# Patient Record
Sex: FEMALE | Race: WHITE | Employment: FULL TIME
[De-identification: names, ages, dates, MRNs, and addresses within clinical notes are randomized per-mention and may not be internally consistent; named-entity substitution may affect disease eponyms.]

---

## 2017-02-06 ENCOUNTER — OFFICE VISIT (OUTPATIENT)
Dept: FAMILY MEDICINE CLINIC | Facility: CLINIC | Age: 37
End: 2017-02-06

## 2017-02-06 VITALS
BODY MASS INDEX: 28.07 KG/M2 | SYSTOLIC BLOOD PRESSURE: 110 MMHG | RESPIRATION RATE: 18 BRPM | HEART RATE: 68 BPM | WEIGHT: 143 LBS | DIASTOLIC BLOOD PRESSURE: 74 MMHG | HEIGHT: 60 IN

## 2017-02-06 DIAGNOSIS — F17.200 SMOKER: Primary | ICD-10-CM

## 2017-02-06 DIAGNOSIS — I10 ESSENTIAL HYPERTENSION: ICD-10-CM

## 2017-02-06 PROCEDURE — 99213 OFFICE O/P EST LOW 20 MIN: CPT | Performed by: NURSE PRACTITIONER

## 2017-02-06 RX ORDER — DILTIAZEM HYDROCHLORIDE 180 MG/1
180 CAPSULE, COATED, EXTENDED RELEASE ORAL DAILY
Qty: 90 CAPSULE | Refills: 1 | Status: SHIPPED | OUTPATIENT
Start: 2017-02-06 | End: 2017-07-26 | Stop reason: SDUPTHER

## 2017-02-06 RX ORDER — HYDROCHLOROTHIAZIDE 25 MG/1
25 TABLET ORAL DAILY
Qty: 90 TABLET | Refills: 1 | Status: SHIPPED | OUTPATIENT
Start: 2017-02-06 | End: 2017-07-26 | Stop reason: SDUPTHER

## 2017-02-06 ASSESSMENT — ENCOUNTER SYMPTOMS
RHINORRHEA: 0
EYES NEGATIVE: 1
COUGH: 0
CHOKING: 0
ABDOMINAL DISTENTION: 0

## 2017-03-09 ENCOUNTER — OFFICE VISIT (OUTPATIENT)
Dept: FAMILY MEDICINE CLINIC | Facility: CLINIC | Age: 37
End: 2017-03-09

## 2017-03-09 VITALS
WEIGHT: 143 LBS | HEART RATE: 100 BPM | TEMPERATURE: 97.6 F | OXYGEN SATURATION: 98 % | SYSTOLIC BLOOD PRESSURE: 122 MMHG | BODY MASS INDEX: 27.93 KG/M2 | DIASTOLIC BLOOD PRESSURE: 80 MMHG

## 2017-03-09 DIAGNOSIS — L24.5 IRRITANT CONTACT DERMATITIS DUE TO OTHER CHEMICAL PRODUCTS: Primary | ICD-10-CM

## 2017-03-09 PROCEDURE — 99213 OFFICE O/P EST LOW 20 MIN: CPT | Performed by: NURSE PRACTITIONER

## 2017-03-09 RX ORDER — TRIAMCINOLONE ACETONIDE 40 MG/ML
40 INJECTION, SUSPENSION INTRA-ARTICULAR; INTRAMUSCULAR ONCE
Status: COMPLETED | OUTPATIENT
Start: 2017-03-09 | End: 2017-03-09

## 2017-03-09 RX ADMIN — TRIAMCINOLONE ACETONIDE 40 MG: 40 INJECTION, SUSPENSION INTRA-ARTICULAR; INTRAMUSCULAR at 08:58

## 2017-03-09 ASSESSMENT — ENCOUNTER SYMPTOMS
COUGH: 0
SORE THROAT: 0
RHINORRHEA: 0
SHORTNESS OF BREATH: 0

## 2017-05-04 LAB
BUN BLDV-MCNC: 19 MG/DL
CALCIUM SERPL-MCNC: 9.8 MG/DL
CHLORIDE BLD-SCNC: 95 MMOL/L
CHOLESTEROL, TOTAL: 226 MG/DL
CHOLESTEROL/HDL RATIO: 4.1
CO2: 27 MMOL/L
CREAT SERPL-MCNC: 0.57 MG/DL
GFR CALCULATED: >60
GLUCOSE BLD-MCNC: 96 MG/DL
HDLC SERPL-MCNC: 55 MG/DL (ref 35–70)
LDL CHOLESTEROL CALCULATED: 146 MG/DL (ref 0–160)
POTASSIUM SERPL-SCNC: 3.8 MMOL/L
SODIUM BLD-SCNC: 137 MMOL/L
TRIGL SERPL-MCNC: 125 MG/DL
VLDLC SERPL CALC-MCNC: NORMAL MG/DL

## 2017-07-26 ENCOUNTER — TELEPHONE (OUTPATIENT)
Dept: FAMILY MEDICINE CLINIC | Age: 37
End: 2017-07-26

## 2017-07-26 DIAGNOSIS — I10 ESSENTIAL HYPERTENSION: ICD-10-CM

## 2017-07-26 RX ORDER — DILTIAZEM HYDROCHLORIDE 180 MG/1
180 CAPSULE, COATED, EXTENDED RELEASE ORAL DAILY
Qty: 90 CAPSULE | Refills: 1 | Status: SHIPPED | OUTPATIENT
Start: 2017-07-26 | End: 2017-08-07 | Stop reason: SDUPTHER

## 2017-07-26 RX ORDER — HYDROCHLOROTHIAZIDE 25 MG/1
25 TABLET ORAL DAILY
Qty: 90 TABLET | Refills: 1 | Status: SHIPPED | OUTPATIENT
Start: 2017-07-26 | End: 2018-02-05 | Stop reason: SDUPTHER

## 2017-08-07 ENCOUNTER — OFFICE VISIT (OUTPATIENT)
Dept: FAMILY MEDICINE CLINIC | Age: 37
End: 2017-08-07
Payer: COMMERCIAL

## 2017-08-07 VITALS
DIASTOLIC BLOOD PRESSURE: 70 MMHG | RESPIRATION RATE: 18 BRPM | OXYGEN SATURATION: 98 % | HEIGHT: 60 IN | SYSTOLIC BLOOD PRESSURE: 120 MMHG | WEIGHT: 144 LBS | HEART RATE: 78 BPM | BODY MASS INDEX: 28.27 KG/M2

## 2017-08-07 DIAGNOSIS — Z23 NEED FOR PROPHYLACTIC VACCINATION AGAINST STREPTOCOCCUS PNEUMONIAE (PNEUMOCOCCUS): ICD-10-CM

## 2017-08-07 DIAGNOSIS — Z00.00 ANNUAL PHYSICAL EXAM: Primary | ICD-10-CM

## 2017-08-07 DIAGNOSIS — I10 ESSENTIAL HYPERTENSION: ICD-10-CM

## 2017-08-07 DIAGNOSIS — F17.200 SMOKER: ICD-10-CM

## 2017-08-07 PROCEDURE — 99395 PREV VISIT EST AGE 18-39: CPT | Performed by: NURSE PRACTITIONER

## 2017-08-07 PROCEDURE — 90732 PPSV23 VACC 2 YRS+ SUBQ/IM: CPT | Performed by: NURSE PRACTITIONER

## 2017-08-07 PROCEDURE — 90471 IMMUNIZATION ADMIN: CPT | Performed by: NURSE PRACTITIONER

## 2017-08-07 RX ORDER — DILTIAZEM HYDROCHLORIDE 180 MG/1
180 CAPSULE, COATED, EXTENDED RELEASE ORAL DAILY
Qty: 90 CAPSULE | Refills: 3 | Status: SHIPPED | OUTPATIENT
Start: 2017-08-07 | End: 2018-05-05 | Stop reason: SDUPTHER

## 2017-08-07 ASSESSMENT — PATIENT HEALTH QUESTIONNAIRE - PHQ9
2. FEELING DOWN, DEPRESSED OR HOPELESS: 0
1. LITTLE INTEREST OR PLEASURE IN DOING THINGS: 0
SUM OF ALL RESPONSES TO PHQ9 QUESTIONS 1 & 2: 0
SUM OF ALL RESPONSES TO PHQ QUESTIONS 1-9: 0

## 2017-08-07 ASSESSMENT — ENCOUNTER SYMPTOMS
SINUS PRESSURE: 0
COUGH: 0
ABDOMINAL DISTENTION: 0

## 2018-01-04 ENCOUNTER — OFFICE VISIT (OUTPATIENT)
Dept: FAMILY MEDICINE CLINIC | Age: 38
End: 2018-01-04
Payer: COMMERCIAL

## 2018-01-04 VITALS
TEMPERATURE: 97.8 F | WEIGHT: 140 LBS | HEART RATE: 102 BPM | BODY MASS INDEX: 27.34 KG/M2 | OXYGEN SATURATION: 97 % | SYSTOLIC BLOOD PRESSURE: 120 MMHG | DIASTOLIC BLOOD PRESSURE: 80 MMHG

## 2018-01-04 DIAGNOSIS — J01.40 ACUTE NON-RECURRENT PANSINUSITIS: Primary | ICD-10-CM

## 2018-01-04 PROCEDURE — 99213 OFFICE O/P EST LOW 20 MIN: CPT | Performed by: FAMILY MEDICINE

## 2018-01-04 PROCEDURE — G8417 CALC BMI ABV UP PARAM F/U: HCPCS | Performed by: FAMILY MEDICINE

## 2018-01-04 PROCEDURE — G8427 DOCREV CUR MEDS BY ELIG CLIN: HCPCS | Performed by: FAMILY MEDICINE

## 2018-01-04 PROCEDURE — 4004F PT TOBACCO SCREEN RCVD TLK: CPT | Performed by: FAMILY MEDICINE

## 2018-01-04 PROCEDURE — G8484 FLU IMMUNIZE NO ADMIN: HCPCS | Performed by: FAMILY MEDICINE

## 2018-01-04 RX ORDER — AMOXICILLIN AND CLAVULANATE POTASSIUM 875; 125 MG/1; MG/1
1 TABLET, FILM COATED ORAL 2 TIMES DAILY
Qty: 14 TABLET | Refills: 0 | Status: SHIPPED | OUTPATIENT
Start: 2018-01-04 | End: 2018-01-11

## 2018-01-04 RX ORDER — FLUTICASONE PROPIONATE 50 MCG
1 SPRAY, SUSPENSION (ML) NASAL DAILY
Qty: 1 BOTTLE | Refills: 1 | Status: SHIPPED | OUTPATIENT
Start: 2018-01-04 | End: 2018-08-13 | Stop reason: ALTCHOICE

## 2018-01-04 NOTE — PROGRESS NOTES
normal.   Neck: Normal range of motion. Neck supple. No thyromegaly present. Cardiovascular: Normal rate, regular rhythm and normal heart sounds. Exam reveals no gallop and no friction rub. No murmur heard. Pulmonary/Chest: Effort normal and breath sounds normal. No respiratory distress. She has no wheezes. She has no rales. She exhibits no tenderness. Abdominal: Soft. Bowel sounds are normal. She exhibits no distension. There is no tenderness. There is no rebound and no guarding. Musculoskeletal: Normal range of motion. She exhibits no edema. Lymphadenopathy:     She has no cervical adenopathy. Neurological: She is alert and oriented to person, place, and time. She exhibits normal muscle tone. Coordination normal.   Skin: Skin is warm and dry. No rash noted. No erythema. Nursing note and vitals reviewed. Vitals:  /80   Pulse 102   Temp 97.8 °F (36.6 °C)   Wt 140 lb (63.5 kg)   SpO2 97%   BMI 27.34 kg/m²       Data:     Lab Results   Component Value Date     05/04/2017    K 3.8 05/04/2017    CL 95 05/04/2017    CO2 27 05/04/2017    BUN 19 05/04/2017    CREATININE 0.57 05/04/2017    GLUCOSE 96 05/04/2017     No results found for: WBC, RBC, HGB, HCT, MCV, MCH, MCHC, RDW, PLT, MPV  Lab Results   Component Value Date    TSH 2.63 01/19/2016     Lab Results   Component Value Date    CHOL 226 05/04/2017    HDL 55 05/04/2017          Assessment:       1. Acute non-recurrent pansinusitis         Plan:     1. Acute sinusitis-Rx Augmentin, Rx Flonase for symptomatic relief of congestion. Recommend Tylenol/ibuprofen as a for pain/fever. Recommend increased fluid intake. Follow-up if not improving.   Patient voiced understanding          Completed Refills   Requested Prescriptions     Signed Prescriptions Disp Refills    amoxicillin-clavulanate (AUGMENTIN) 875-125 MG per tablet 14 tablet 0     Sig: Take 1 tablet by mouth 2 times daily for 7 days    fluticasone (FLONASE) 50 MCG/ACT nasal spray 1 Bottle 1     Si spray by Nasal route daily     Return if symptoms worsen or fail to improve. Orders Placed This Encounter   Medications    amoxicillin-clavulanate (AUGMENTIN) 875-125 MG per tablet     Sig: Take 1 tablet by mouth 2 times daily for 7 days     Dispense:  14 tablet     Refill:  0    fluticasone (FLONASE) 50 MCG/ACT nasal spray     Si spray by Nasal route daily     Dispense:  1 Bottle     Refill:  1     No orders of the defined types were placed in this encounter. There are no Patient Instructions on file for this visit. Electronically signed by Geni Waterman DO on 2018 at 1:19 PM         Completed Refills   Requested Prescriptions     Signed Prescriptions Disp Refills    amoxicillin-clavulanate (AUGMENTIN) 875-125 MG per tablet 14 tablet 0     Sig: Take 1 tablet by mouth 2 times daily for 7 days    fluticasone (FLONASE) 50 MCG/ACT nasal spray 1 Bottle 1     Si spray by Nasal route daily         Marislo Lewis received counseling on the following healthy behaviors: nutrition and exercise  Reviewed prior labs and health maintenance. Continue current medications, diet and exercise. Discussed use, benefit, and side effects of prescribed medications. Barriers to medication compliance addressed. Patient given educational materials - see patient instructions. All patient questions answered. Patient voiced understanding.

## 2018-01-14 ASSESSMENT — ENCOUNTER SYMPTOMS
TROUBLE SWALLOWING: 0
VOMITING: 0
RHINORRHEA: 1
NAUSEA: 0
CONSTIPATION: 0
SHORTNESS OF BREATH: 0
SINUS PRESSURE: 1
ABDOMINAL DISTENTION: 0
COUGH: 1
PHOTOPHOBIA: 0
DIARRHEA: 0
COLOR CHANGE: 0
BACK PAIN: 0
SINUS PAIN: 1
ABDOMINAL PAIN: 0
WHEEZING: 0

## 2018-02-05 ENCOUNTER — TELEPHONE (OUTPATIENT)
Dept: FAMILY MEDICINE CLINIC | Age: 38
End: 2018-02-05

## 2018-02-05 RX ORDER — HYDROCHLOROTHIAZIDE 25 MG/1
25 TABLET ORAL DAILY
Qty: 90 TABLET | Refills: 1 | Status: SHIPPED | OUTPATIENT
Start: 2018-02-05 | End: 2018-06-30 | Stop reason: SDUPTHER

## 2018-05-05 DIAGNOSIS — I10 ESSENTIAL HYPERTENSION: ICD-10-CM

## 2018-05-07 RX ORDER — DILTIAZEM HYDROCHLORIDE 180 MG/1
180 CAPSULE, COATED, EXTENDED RELEASE ORAL DAILY
Qty: 90 CAPSULE | Refills: 1 | Status: SHIPPED | OUTPATIENT
Start: 2018-05-07 | End: 2018-08-13 | Stop reason: SDUPTHER

## 2018-08-13 ENCOUNTER — OFFICE VISIT (OUTPATIENT)
Dept: FAMILY MEDICINE CLINIC | Age: 38
End: 2018-08-13
Payer: COMMERCIAL

## 2018-08-13 VITALS
SYSTOLIC BLOOD PRESSURE: 130 MMHG | WEIGHT: 142 LBS | BODY MASS INDEX: 26.81 KG/M2 | HEIGHT: 61 IN | DIASTOLIC BLOOD PRESSURE: 74 MMHG | HEART RATE: 79 BPM | OXYGEN SATURATION: 99 %

## 2018-08-13 DIAGNOSIS — I10 ESSENTIAL HYPERTENSION: ICD-10-CM

## 2018-08-13 DIAGNOSIS — Z00.00 ANNUAL PHYSICAL EXAM: Primary | ICD-10-CM

## 2018-08-13 DIAGNOSIS — H57.02 UNEQUAL PUPILS: ICD-10-CM

## 2018-08-13 DIAGNOSIS — F17.200 SMOKER: ICD-10-CM

## 2018-08-13 PROCEDURE — 99395 PREV VISIT EST AGE 18-39: CPT | Performed by: NURSE PRACTITIONER

## 2018-08-13 RX ORDER — DILTIAZEM HYDROCHLORIDE 180 MG/1
180 CAPSULE, COATED, EXTENDED RELEASE ORAL DAILY
Qty: 90 CAPSULE | Refills: 3 | Status: SHIPPED | OUTPATIENT
Start: 2018-08-13 | End: 2019-06-30 | Stop reason: SDUPTHER

## 2018-08-13 ASSESSMENT — PATIENT HEALTH QUESTIONNAIRE - PHQ9
SUM OF ALL RESPONSES TO PHQ QUESTIONS 1-9: 0
1. LITTLE INTEREST OR PLEASURE IN DOING THINGS: 0
SUM OF ALL RESPONSES TO PHQ QUESTIONS 1-9: 0
SUM OF ALL RESPONSES TO PHQ9 QUESTIONS 1 & 2: 0
2. FEELING DOWN, DEPRESSED OR HOPELESS: 0

## 2018-08-13 ASSESSMENT — ENCOUNTER SYMPTOMS
ABDOMINAL DISTENTION: 0
CHEST TIGHTNESS: 0
SHORTNESS OF BREATH: 0
EYES NEGATIVE: 1

## 2018-08-13 NOTE — PROGRESS NOTES
Subjective:      Patient ID: Marek Richards is a 41 y.o. female. Social History     Social History    Marital status:      Spouse name: López Landrum Number of children: 1    Years of education: 15     Social History Main Topics    Smoking status: Current Every Day Smoker     Packs/day: 0.50     Types: Cigarettes    Smokeless tobacco: Never Used    Alcohol use Yes      Comment: rarely    Drug use: No    Sexual activity: Not Asked     Other Topics Concern    None     Social History Narrative    None     Family History   Problem Relation Age of Onset    Hypertension Mother     Depression Mother     Other Father         essential tremor    Other Brother         familial tremor    Heart Disease Brother         valve disorder    Hypertension Maternal Grandmother     Emphysema Maternal Grandfather         smoker    Other Paternal Grandmother         sleep apnea    Heart Disease Paternal Grandfather         11 stents,     Other Paternal Grandfather         back problems nerve stimulator     No past medical history on file. HPI  Annual physical   Right handed   RN at [employer].   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       htn- doing okay no headache, no chest pain. Weight stable    Smoking 1/2 ppd- smoking cessation  Tried for one day. Tried chantix was nausea. Pap- Thierno Alexander will schedule appt. Never abnormal pap. Left 8 mm and right pupil 6 mm. Review of Systems   Constitutional: Negative for activity change, chills and fever. HENT: Negative for congestion. Eyes: Negative. Respiratory: Negative for chest tightness and shortness of breath. Cardiovascular: Negative for chest pain, palpitations and leg swelling. Gastrointestinal: Negative for abdominal distention. Musculoskeletal: Negative for arthralgias. Neurological: Negative for dizziness. Psychiatric/Behavioral: Negative for agitation.        Objective:   Physical Exam   Constitutional: She is oriented to person, place, and time. She appears well-developed and well-nourished. No distress. HENT:   Head: Normocephalic and atraumatic. Right Ear: External ear normal.   Left Ear: External ear normal.   Mouth/Throat: Oropharynx is clear and moist.   Eyes: Pupils are equal, round, and reactive to light. EOM are normal. No scleral icterus. Neck: Normal range of motion. Neck supple. No thyromegaly present. Cardiovascular: Normal rate, regular rhythm and normal heart sounds. No murmur heard. No carotid bruit maeve   Pulmonary/Chest: Effort normal and breath sounds normal. No respiratory distress. She has no wheezes. Abdominal: Soft. There is no tenderness. Musculoskeletal: Normal range of motion. She exhibits no edema. Lymphadenopathy:     She has no cervical adenopathy. Neurological: She is alert and oriented to person, place, and time. Skin: Skin is warm and dry. No rash noted. Psychiatric: She has a normal mood and affect. Her behavior is normal. Judgment and thought content normal.   Nursing note and vitals reviewed. Assessment / Plan:   1. Annual physical exam      2. Essential hypertension  Stable and controlled  - diltiazem (CARDIZEM CD) 180 MG extended release capsule; Take 1 capsule by mouth daily  Dispense: 90 capsule; Refill: 3    3. Smoker  Encouraged to quit, discussed patch gum, has tried several options with meds in past with no success. Is decreasing. 4. Unequal pupils  Chronic.         see annually with labs every 6 months  1. [facility] received counseling on the following healthy behaviors: nutrition, exercise and medication adherence    anticipatory guidance, seat belt/car seat safety and medication adherence  2. Patient given educational materials - see patient instructions  3. Discussed use, benefit, and side effects of prescribed medications. Barriers to medication compliance addressed. All patient questions answered. Pt voiced understanding.    4.  Reviewed prior labs and health

## 2019-01-03 ENCOUNTER — TELEPHONE (OUTPATIENT)
Dept: FAMILY MEDICINE CLINIC | Age: 39
End: 2019-01-03

## 2019-01-03 DIAGNOSIS — Z13.29 SCREENING FOR THYROID DISORDER: Primary | ICD-10-CM

## 2019-01-03 DIAGNOSIS — I10 ESSENTIAL HYPERTENSION: ICD-10-CM

## 2019-01-08 ENCOUNTER — HOSPITAL ENCOUNTER (OUTPATIENT)
Age: 39
Discharge: HOME | End: 2019-01-08
Payer: COMMERCIAL

## 2019-01-08 DIAGNOSIS — Z13.29 SCREENING FOR THYROID DISORDER: ICD-10-CM

## 2019-01-08 DIAGNOSIS — I10 ESSENTIAL HYPERTENSION: ICD-10-CM

## 2019-01-08 LAB
ANION GAP SERPL CALCULATED.3IONS-SCNC: 12 MMOL/L (ref 9–17)
BUN BLDV-MCNC: 12 MG/DL (ref 6–20)
BUN/CREAT BLD: 20 (ref 9–20)
CALCIUM SERPL-MCNC: 10.1 MG/DL (ref 8.6–10.4)
CHLORIDE BLD-SCNC: 95 MMOL/L (ref 98–107)
CO2: 30 MMOL/L (ref 20–31)
CREAT SERPL-MCNC: 0.6 MG/DL (ref 0.5–0.9)
GFR AFRICAN AMERICAN: >60 ML/MIN
GFR NON-AFRICAN AMERICAN: >60 ML/MIN
GFR SERPL CREATININE-BSD FRML MDRD: ABNORMAL ML/MIN/{1.73_M2}
GFR SERPL CREATININE-BSD FRML MDRD: ABNORMAL ML/MIN/{1.73_M2}
GLUCOSE BLD-MCNC: 98 MG/DL (ref 70–99)
POTASSIUM SERPL-SCNC: 3.7 MMOL/L (ref 3.7–5.3)
SODIUM BLD-SCNC: 137 MMOL/L (ref 135–144)
TSH SERPL DL<=0.05 MIU/L-ACNC: 1.75 MIU/L (ref 0.3–5)

## 2019-01-08 PROCEDURE — 84443 ASSAY THYROID STIM HORMONE: CPT

## 2019-01-08 PROCEDURE — 80048 BASIC METABOLIC PNL TOTAL CA: CPT

## 2019-01-08 PROCEDURE — 36415 COLL VENOUS BLD VENIPUNCTURE: CPT

## 2019-01-08 RX ORDER — NICOTINE 21 MG/24HR
1 PATCH, TRANSDERMAL 24 HOURS TRANSDERMAL EVERY 24 HOURS
Qty: 42 PATCH | Refills: 0 | Status: SHIPPED | OUTPATIENT
Start: 2019-01-08 | End: 2019-08-19 | Stop reason: ALTCHOICE

## 2019-02-19 ENCOUNTER — TELEPHONE (OUTPATIENT)
Dept: FAMILY MEDICINE CLINIC | Age: 39
End: 2019-02-19

## 2019-02-19 RX ORDER — OSELTAMIVIR PHOSPHATE 75 MG/1
75 CAPSULE ORAL DAILY
Qty: 10 CAPSULE | Refills: 0 | Status: SHIPPED | OUTPATIENT
Start: 2019-02-19 | End: 2019-03-01

## 2019-06-30 DIAGNOSIS — I10 ESSENTIAL HYPERTENSION: ICD-10-CM

## 2019-07-01 RX ORDER — DILTIAZEM HYDROCHLORIDE 180 MG/1
180 CAPSULE, COATED, EXTENDED RELEASE ORAL DAILY
Qty: 90 CAPSULE | Refills: 3 | Status: SHIPPED | OUTPATIENT
Start: 2019-07-01 | End: 2020-04-27 | Stop reason: SDUPTHER

## 2019-07-01 RX ORDER — HYDROCHLOROTHIAZIDE 25 MG/1
25 TABLET ORAL DAILY
Qty: 90 TABLET | Refills: 3 | Status: SHIPPED | OUTPATIENT
Start: 2019-07-01 | End: 2020-04-27 | Stop reason: SDUPTHER

## 2019-08-19 ENCOUNTER — OFFICE VISIT (OUTPATIENT)
Dept: FAMILY MEDICINE CLINIC | Age: 39
End: 2019-08-19
Payer: COMMERCIAL

## 2019-08-19 VITALS
HEIGHT: 62 IN | BODY MASS INDEX: 27.23 KG/M2 | HEART RATE: 86 BPM | OXYGEN SATURATION: 98 % | WEIGHT: 148 LBS | TEMPERATURE: 98.2 F | DIASTOLIC BLOOD PRESSURE: 76 MMHG | SYSTOLIC BLOOD PRESSURE: 120 MMHG

## 2019-08-19 DIAGNOSIS — F17.200 SMOKER: ICD-10-CM

## 2019-08-19 DIAGNOSIS — Z00.00 ANNUAL PHYSICAL EXAM: Primary | ICD-10-CM

## 2019-08-19 DIAGNOSIS — F41.9 ANXIETY: ICD-10-CM

## 2019-08-19 DIAGNOSIS — I10 ESSENTIAL HYPERTENSION: ICD-10-CM

## 2019-08-19 PROCEDURE — 99395 PREV VISIT EST AGE 18-39: CPT | Performed by: NURSE PRACTITIONER

## 2019-08-19 RX ORDER — BUPROPION HYDROCHLORIDE 150 MG/1
150 TABLET, EXTENDED RELEASE ORAL 2 TIMES DAILY
COMMUNITY
Start: 2019-08-15 | End: 2021-09-20 | Stop reason: ALTCHOICE

## 2019-08-19 ASSESSMENT — ENCOUNTER SYMPTOMS
COUGH: 0
ABDOMINAL DISTENTION: 0

## 2019-08-19 ASSESSMENT — PATIENT HEALTH QUESTIONNAIRE - PHQ9
1. LITTLE INTEREST OR PLEASURE IN DOING THINGS: 0
2. FEELING DOWN, DEPRESSED OR HOPELESS: 0
SUM OF ALL RESPONSES TO PHQ QUESTIONS 1-9: 0
SUM OF ALL RESPONSES TO PHQ9 QUESTIONS 1 & 2: 0
SUM OF ALL RESPONSES TO PHQ QUESTIONS 1-9: 0

## 2020-04-27 ENCOUNTER — PATIENT MESSAGE (OUTPATIENT)
Dept: FAMILY MEDICINE CLINIC | Age: 40
End: 2020-04-27

## 2020-04-27 RX ORDER — HYDROCHLOROTHIAZIDE 25 MG/1
25 TABLET ORAL DAILY
Qty: 90 TABLET | Refills: 1 | Status: SHIPPED | OUTPATIENT
Start: 2020-04-27 | End: 2020-10-28

## 2020-04-27 RX ORDER — DILTIAZEM HYDROCHLORIDE 180 MG/1
180 CAPSULE, COATED, EXTENDED RELEASE ORAL DAILY
Qty: 90 CAPSULE | Refills: 1 | Status: SHIPPED | OUTPATIENT
Start: 2020-04-27 | End: 2020-10-28

## 2020-08-24 ENCOUNTER — OFFICE VISIT (OUTPATIENT)
Dept: FAMILY MEDICINE CLINIC | Age: 40
End: 2020-08-24
Payer: COMMERCIAL

## 2020-08-24 VITALS
SYSTOLIC BLOOD PRESSURE: 114 MMHG | BODY MASS INDEX: 28.89 KG/M2 | OXYGEN SATURATION: 98 % | DIASTOLIC BLOOD PRESSURE: 78 MMHG | TEMPERATURE: 98.6 F | HEIGHT: 61 IN | WEIGHT: 153 LBS | HEART RATE: 88 BPM

## 2020-08-24 PROCEDURE — 99395 PREV VISIT EST AGE 18-39: CPT | Performed by: NURSE PRACTITIONER

## 2020-08-24 SDOH — ECONOMIC STABILITY: FOOD INSECURITY: WITHIN THE PAST 12 MONTHS, YOU WORRIED THAT YOUR FOOD WOULD RUN OUT BEFORE YOU GOT MONEY TO BUY MORE.: NEVER TRUE

## 2020-08-24 SDOH — ECONOMIC STABILITY: INCOME INSECURITY: HOW HARD IS IT FOR YOU TO PAY FOR THE VERY BASICS LIKE FOOD, HOUSING, MEDICAL CARE, AND HEATING?: NOT HARD AT ALL

## 2020-08-24 SDOH — ECONOMIC STABILITY: TRANSPORTATION INSECURITY
IN THE PAST 12 MONTHS, HAS THE LACK OF TRANSPORTATION KEPT YOU FROM MEDICAL APPOINTMENTS OR FROM GETTING MEDICATIONS?: NO

## 2020-08-24 SDOH — ECONOMIC STABILITY: FOOD INSECURITY: WITHIN THE PAST 12 MONTHS, THE FOOD YOU BOUGHT JUST DIDN'T LAST AND YOU DIDN'T HAVE MONEY TO GET MORE.: NEVER TRUE

## 2020-08-24 SDOH — ECONOMIC STABILITY: TRANSPORTATION INSECURITY
IN THE PAST 12 MONTHS, HAS LACK OF TRANSPORTATION KEPT YOU FROM MEETINGS, WORK, OR FROM GETTING THINGS NEEDED FOR DAILY LIVING?: NO

## 2020-08-24 ASSESSMENT — ENCOUNTER SYMPTOMS
RHINORRHEA: 0
COUGH: 0
EYES NEGATIVE: 1
SINUS PAIN: 0
ABDOMINAL DISTENTION: 0
SHORTNESS OF BREATH: 0

## 2020-08-24 NOTE — PROGRESS NOTES
2020    Priti Baez (:  1980) is a 47 y.o. female, here for a preventive medicine evaluation. 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 completely asymptomatic and did well. Returned to work 2020. Pt's Child did not get tested. No other family members were ill. New onset mass left upper arm irregular shaped estimated 8 cm length x 5 cm width palpable , possible lipoma suspected. Non tender , new finding in last 2 months. No lymphadenopathy. No fever no night sweats. Weight stable    Wt Readings from Last 3 Encounters:   20 153 lb (69.4 kg)   19 148 lb (67.1 kg)   18 142 lb (64.4 kg)     Quit smoking. On wellbutrin through GYN (hx zoloft use prior)     htn-no chest pain, no dizziness, no shortness of breath. Maternal hx alpha antitrypsin positive pt would like tested too. Stopped smoking feels better. On wellbutrin. Controlling anxiety      Review of Systems   Constitutional: Negative for activity change, appetite change, chills and fever. HENT: Negative for congestion, rhinorrhea and sinus pain. Eyes: Negative. Respiratory: Negative for cough and shortness of breath. Cardiovascular: Negative for chest pain and leg swelling. Gastrointestinal: Negative for abdominal distention. Endocrine: Negative for cold intolerance and heat intolerance. Genitourinary: Negative for difficulty urinating. Musculoskeletal: Negative for arthralgias. Left upper arm lump incidental finding when swimming this summer. Skin: Negative for rash. Neurological: Negative for dizziness and headaches. Prior to Visit Medications    Medication Sig Taking?  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         Past Surgical History:   Procedure Laterality Date     SECTION      Dr. Mackenzie Cobos Right 2013    SALPINGECTOMY Bilateral 2016       Social History     Socioeconomic History    Marital status:      Spouse name: Lucas Wells Number of children: 1    Years of education: 15    Highest education level: Not on file   Occupational History    Not on file   Social Needs    Financial resource strain: Not hard at all   Nextly insecurity     Worry: Never true     Inability: Never true   [employer] needs     Medical: No     Non-medical: No   Tobacco Use    Smoking status: Former Smoker     Packs/day: 0.50     Types: Cigarettes     Last attempt to quit: 2019     Years since quittin.6    Smokeless tobacco: Never Used   Substance and Sexual Activity    Alcohol use: Yes     Comment: rarely    Drug use: No    Sexual activity: Not on file   Lifestyle    Physical activity     Days per week: Not on file     Minutes per session: Not on file    Stress: Not on file   Relationships    Social connections     Talks on phone: Not on file     Gets together: Not on file     Attends Buddhist service: Not on file     Active member of club or organization: Not on file     Attends meetings of clubs or organizations: Not on file     Relationship status: Not on file    Intimate partner violence     Fear of current or ex partner: Not on file     Emotionally abused: Not on file Physically abused: Not on file     Forced sexual activity: Not on file   Other Topics Concern    Not on file   Social History Narrative    Not on file        Family History   Problem Relation Age of Onset    Hypertension Mother     Depression Mother    Frida Arch Other Mother 61        alpha 1 antitrypsin deficiency     Other Father         essential tremor    Other Brother         familial tremor    Heart Disease Brother         valve disorder    Hypertension Maternal Grandmother     Emphysema Maternal Grandfather         smoker    Other Paternal Grandmother         sleep apnea    Heart Disease Paternal Grandfather         11 stents,     Other Paternal Grandfather         back problems nerve stimulator       ADVANCE DIRECTIVE: N, <no information>    Vitals:    08/24/20 1458   BP: 114/78   Site: Left Upper Arm   Position: Sitting   Pulse: 88   Temp: 98.6 °F (37 °C)   TempSrc: Oral   SpO2: 98%   Weight: 153 lb (69.4 kg)   Height: 5' 1\" (1.549 m)     Estimated body mass index is 28.91 kg/m² as calculated from the following:    Height as of this encounter: 5' 1\" (1.549 m). Weight as of this encounter: 153 lb (69.4 kg). Physical Exam  Vitals signs and nursing note reviewed. Constitutional:       General: She is not in acute distress. Appearance: Normal appearance. She is well-developed. HENT:      Head: Normocephalic and atraumatic. Right Ear: Tympanic membrane and external ear normal.      Left Ear: Tympanic membrane and external ear normal.      Nose: No congestion. Mouth/Throat:      Mouth: Mucous membranes are dry. Eyes:      General: No scleral icterus. Pupils: Pupils are equal, round, and reactive to light. Neck:      Musculoskeletal: Normal range of motion and neck supple. Cardiovascular:      Rate and Rhythm: Normal rate and regular rhythm. Heart sounds: Normal heart sounds. No murmur. Pulmonary:      Effort: Pulmonary effort is normal. No respiratory distress. Breath sounds: Normal breath sounds. No wheezing. Abdominal:      Palpations: Abdomen is soft. Tenderness: There is no abdominal tenderness. Musculoskeletal: Normal range of motion. General: No tenderness. Lymphadenopathy:      Cervical: No cervical adenopathy. Skin:     General: Skin is warm and dry. Neurological:      Mental Status: She is alert and oriented to person, place, and time. Psychiatric:         Behavior: Behavior normal.         Thought Content: Thought content normal.         Judgment: Judgment normal.         Right upper arm mass soft like lipoma new found by pt in last 2 months 7 cm length x 5 cm width. No flowsheet data found. Lab Results   Component Value Date    CHOL 226 05/04/2017    CHOL 214 01/19/2016    CHOL 214 08/14/2013    TRIG 125 05/04/2017    TRIG 199 01/19/2016    TRIG 96 08/14/2013    HDL 55 05/04/2017    HDL 50 01/19/2016    HDL 55 08/14/2013    LDLCHOLESTEROL 124 01/19/2016    LDLCHOLESTEROL 140 08/14/2013    LDLCALC 146 05/04/2017    GLUCOSE 98 01/08/2019       The ASCVD Risk score (Herlinda Earl, et al., 2013) failed to calculate for the following reasons: The 2013 ASCVD risk score is only valid for ages 36 to 78    Immunization History   Administered Date(s) Administered    Pneumococcal Polysaccharide ([id number]) 08/07/2017    Tdap (Boostrix, Adacel) 05/19/2013       Health Maintenance   Topic Date Due    HIV screen  02/20/1997    Varicella vaccine (2 of 2 - 13+ 2-dose series) 09/12/2014    Potassium monitoring  01/08/2020    Creatinine monitoring  01/08/2020    Flu vaccine (1) 09/01/2020    Cervical cancer screen  02/21/2022    DTaP/Tdap/Td vaccine (3 - Td) 08/15/2024    Hepatitis A vaccine  Aged Out    Hepatitis B vaccine  Aged Out    Hib vaccine  Aged Out    Meningococcal (ACWY) vaccine  Aged Out    Pneumococcal 0-64 years Vaccine  Aged Out       ASSESSMENT/PLAN:  1. Annual physical exam    - Lipid Panel;  Future  -

## 2020-08-24 NOTE — PATIENT INSTRUCTIONS
You may be receiving a survey from [employer] regarding your visit today. You may get this in the mail, through your MyChart or in your email. Please complete the survey to enable us to provide the highest quality of care to you and your family. If you cannot score us as very good ( 5 Stars) on any question, please feel free to call the office to discuss how we could have made your experience exceptional.     Thank You! BRUNA Nguyễn-CNP  [address]  [place], [place]

## 2020-08-25 ENCOUNTER — HOSPITAL ENCOUNTER (OUTPATIENT)
Age: 40
Discharge: HOME | End: 2020-08-25
Payer: COMMERCIAL

## 2020-08-25 LAB
ALBUMIN SERPL-MCNC: 4.8 G/DL (ref 3.5–5.2)
ALBUMIN/GLOBULIN RATIO: ABNORMAL (ref 1–2.5)
ALP BLD-CCNC: 73 U/L (ref 35–104)
ALPHA-1 ANTITRYPSIN: 87 MG/DL (ref 90–200)
ALT SERPL-CCNC: 12 U/L (ref 5–33)
ANION GAP SERPL CALCULATED.3IONS-SCNC: 11 MMOL/L (ref 9–17)
AST SERPL-CCNC: 18 U/L
BILIRUB SERPL-MCNC: 0.45 MG/DL (ref 0.3–1.2)
BUN BLDV-MCNC: 8 MG/DL (ref 6–20)
BUN/CREAT BLD: 11 (ref 9–20)
CALCIUM SERPL-MCNC: 10.2 MG/DL (ref 8.6–10.4)
CHLORIDE BLD-SCNC: 96 MMOL/L (ref 98–107)
CHOLESTEROL/HDL RATIO: 3.8
CHOLESTEROL: 215 MG/DL
CO2: 29 MMOL/L (ref 20–31)
CREAT SERPL-MCNC: 0.7 MG/DL (ref 0.5–0.9)
GFR AFRICAN AMERICAN: >60 ML/MIN
GFR NON-AFRICAN AMERICAN: >60 ML/MIN
GFR SERPL CREATININE-BSD FRML MDRD: ABNORMAL ML/MIN/{1.73_M2}
GFR SERPL CREATININE-BSD FRML MDRD: ABNORMAL ML/MIN/{1.73_M2}
GLUCOSE BLD-MCNC: 107 MG/DL (ref 70–99)
HDLC SERPL-MCNC: 56 MG/DL
LDL CHOLESTEROL: 137 MG/DL (ref 0–130)
PATIENT FASTING?: YES
POTASSIUM SERPL-SCNC: 3.4 MMOL/L (ref 3.7–5.3)
SODIUM BLD-SCNC: 136 MMOL/L (ref 135–144)
TOTAL PROTEIN: 8.6 G/DL (ref 6.4–8.3)
TRIGL SERPL-MCNC: 110 MG/DL
VLDLC SERPL CALC-MCNC: ABNORMAL MG/DL (ref 1–30)

## 2020-08-25 PROCEDURE — 80061 LIPID PANEL: CPT

## 2020-08-25 PROCEDURE — 82103 ALPHA-1-ANTITRYPSIN TOTAL: CPT

## 2020-08-25 PROCEDURE — 36415 COLL VENOUS BLD VENIPUNCTURE: CPT

## 2020-08-25 PROCEDURE — 80053 COMPREHEN METABOLIC PANEL: CPT

## 2020-10-28 RX ORDER — HYDROCHLOROTHIAZIDE 25 MG/1
TABLET ORAL
Qty: 90 TABLET | Refills: 3 | Status: SHIPPED | OUTPATIENT
Start: 2020-10-28 | End: 2021-09-20 | Stop reason: SDUPTHER

## 2020-10-28 RX ORDER — DILTIAZEM HYDROCHLORIDE 180 MG/1
CAPSULE, COATED, EXTENDED RELEASE ORAL
Qty: 90 CAPSULE | Refills: 3 | Status: SHIPPED | OUTPATIENT
Start: 2020-10-28 | End: 2021-09-20 | Stop reason: SDUPTHER

## 2020-11-30 ENCOUNTER — TELEPHONE (OUTPATIENT)
Dept: FAMILY MEDICINE CLINIC | Age: 40
End: 2020-11-30

## 2020-11-30 NOTE — TELEPHONE ENCOUNTER
Patient would like a letter stating that she is exempt for the flu shot - please let her know - states that she never has gotten the flu shot    Health Maintenance   Topic Date Due    HIV screen  02/20/1997    Varicella vaccine (2 of 2 - 13+ 2-dose series) 09/12/2014    Flu vaccine (1) 09/01/2020    Potassium monitoring  08/25/2021    Creatinine monitoring  08/25/2021    Cervical cancer screen  02/21/2022    DTaP/Tdap/Td vaccine (3 - Td) 08/15/2024    Hepatitis A vaccine  Aged Out    Hepatitis B vaccine  Aged Out    Hib vaccine  Aged Out    Meningococcal (ACWY) vaccine  Aged Out    Pneumococcal 0-64 years Vaccine  Aged Out             (applicable per patient's age: Cancer Screenings, Depression Screening, Fall Risk Screening, Immunizations)    LDL Cholesterol (mg/dL)   Date Value   08/25/2020 137 (H)     LDL Calculated (mg/dL)   Date Value   05/04/2017 146     AST (U/L)   Date Value   08/25/2020 18     ALT (U/L)   Date Value   08/25/2020 12     BUN (mg/dL)   Date Value   08/25/2020 8      (goal A1C is < 7)   (goal LDL is <100) need 30-50% reduction from baseline     BP Readings from Last 3 Encounters:   08/24/20 114/78   08/19/19 120/76   08/13/18 130/74    (goal /80)      All Future Testing planned in [facility]:      Next Visit Date:  Future Appointments   Date Time Provider Ara Chilel   8/23/2021  3:30 PM BRUNA Girard - ZIYAD Josues Signs MED [facility]            Patient Active Problem List:     Hypertension     Rosacea     Smoker     Unequal pupils

## 2020-12-23 ENCOUNTER — HOSPITAL ENCOUNTER (OUTPATIENT)
Dept: ULTRASOUND IMAGING | Age: 40
Discharge: HOME | End: 2020-12-25
Payer: COMMERCIAL

## 2020-12-23 PROCEDURE — 76882 US LMTD JT/FCL EVL NVASC XTR: CPT

## 2021-03-01 ENCOUNTER — OFFICE VISIT (OUTPATIENT)
Dept: FAMILY MEDICINE CLINIC | Age: 41
End: 2021-03-01
Payer: COMMERCIAL

## 2021-03-01 VITALS
SYSTOLIC BLOOD PRESSURE: 110 MMHG | HEART RATE: 94 BPM | OXYGEN SATURATION: 99 % | DIASTOLIC BLOOD PRESSURE: 80 MMHG | BODY MASS INDEX: 29.29 KG/M2 | WEIGHT: 155 LBS

## 2021-03-01 DIAGNOSIS — D17.22 LIPOMA OF LEFT UPPER EXTREMITY: ICD-10-CM

## 2021-03-01 DIAGNOSIS — I10 ESSENTIAL HYPERTENSION: ICD-10-CM

## 2021-03-01 DIAGNOSIS — F41.9 ANXIETY: ICD-10-CM

## 2021-03-01 DIAGNOSIS — Z01.818 PREOP GENERAL PHYSICAL EXAM: Primary | ICD-10-CM

## 2021-03-01 PROCEDURE — 99213 OFFICE O/P EST LOW 20 MIN: CPT | Performed by: NURSE PRACTITIONER

## 2021-03-01 ASSESSMENT — PATIENT HEALTH QUESTIONNAIRE - PHQ9
SUM OF ALL RESPONSES TO PHQ QUESTIONS 1-9: 0
1. LITTLE INTEREST OR PLEASURE IN DOING THINGS: 0
SUM OF ALL RESPONSES TO PHQ QUESTIONS 1-9: 0
SUM OF ALL RESPONSES TO PHQ9 QUESTIONS 1 & 2: 0
SUM OF ALL RESPONSES TO PHQ QUESTIONS 1-9: 0

## 2021-03-01 NOTE — PROGRESS NOTES
[facility] [address] [address]  Dept: [phone]  Dept Fax: [phone]    Last encounter 2020    Pre-op Exam (Pt here for pre op clearance for surgery on 21.)       HPI:   Antoinette No is a 46 y.o. female who presentstoday for her medical conditions/complaints as noted below. Antoinette No is c/o of Pre-op Exam (Pt here for pre op clearance for surgery on 21.)      HPI  Established patient    Patient here today for preop physical related to a left upper arm lipoma being treated by Ksenia Rae  plastic surgeon with a surgery scheduled in 2020    EKG received reviewed and shows NSR    Pt denies any chest pain, dizziness or shortness of breath. 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        Past Surgical History:   Procedure Laterality Date     SECTION      Dr. Crain Estimable Right 2013    SALPINGECTOMY Bilateral 2016       Family History   Problem Relation Age of Onset    Hypertension Mother     Depression Mother     Other Mother 61        alpha 1 antitrypsin deficiency     Other Father         essential tremor maeve upper extremities    Other Brother         familial tremor    Heart Disease Brother         valve disorder    Hypertension Maternal Grandmother     Emphysema Maternal Grandfather         smoker    Other Paternal Grandmother         sleep apnea    Heart Disease Paternal Grandfather         11 stents,     Other Paternal Grandfather         back problems nerve stimulator       Social History     Tobacco Use    Smoking status: Former Smoker     Packs/day: 0.50     Types: Cigarettes     Quit date: 2019     Years since quittin.1    Smokeless tobacco: Never Used Substance Use Topics    Alcohol use: Yes     Comment: rarely      Current Outpatient Medications   Medication Sig Dispense Refill    hydroCHLOROthiazide (HYDRODIURIL) 25 MG tablet TAKE 1 TABLET DAILY 90 tablet 3    dilTIAZem (CARDIZEM CD) 180 MG extended release capsule TAKE 1 CAPSULE DAILY 90 capsule 3    buPROPion (WELLBUTRIN SR) 150 MG extended release tablet Take 150 mg by mouth 2 times daily       No current facility-administered medications for this visit. No Known Allergies    Health Maintenance   Topic Date Due    Hepatitis C screen  Never done    HIV screen  Never done    Varicella vaccine (2 of 2 - 13+ 2-dose series) 09/12/2014    Flu vaccine (1) Never done    Potassium monitoring  08/25/2021    Creatinine monitoring  08/25/2021    Cervical cancer screen  02/21/2022    DTaP/Tdap/Td vaccine (3 - Td) 08/15/2024    Hepatitis A vaccine  Aged Out    Hepatitis B vaccine  Aged Out    Hib vaccine  Aged Out    Meningococcal (ACWY) vaccine  Aged Out    Pneumococcal 0-64 years Vaccine  Aged Out       Subjective:      Review of Systems   Constitutional: Negative for activity change, appetite change, chills and fever. HENT: Negative for congestion, rhinorrhea and sore throat. Eyes: Negative. Respiratory: Negative for cough and shortness of breath. Cardiovascular: Negative for chest pain. Gastrointestinal: Negative for abdominal distention. Endocrine: Negative for cold intolerance and heat intolerance. Genitourinary: Negative for difficulty urinating. Musculoskeletal: Negative for arthralgias and gait problem. Neurological: Negative for dizziness and headaches. Psychiatric/Behavioral: Negative for agitation. The patient is not nervous/anxious. Objective:     Physical Exam  Vitals signs and nursing note reviewed. Constitutional:       General: She is not in acute distress. Appearance: Normal appearance. She is well-developed.    HENT:      Head: Normocephalic and atraumatic. Right Ear: Tympanic membrane and external ear normal.      Left Ear: Tympanic membrane and external ear normal.      Nose: No congestion. Mouth/Throat:      Mouth: Mucous membranes are moist.   Eyes:      General: No scleral icterus. Pupils: Pupils are equal, round, and reactive to light. Neck:      Musculoskeletal: Normal range of motion and neck supple. Cardiovascular:      Rate and Rhythm: Normal rate and regular rhythm. Heart sounds: Normal heart sounds. No murmur. Pulmonary:      Effort: Pulmonary effort is normal. No respiratory distress. Breath sounds: Normal breath sounds. No wheezing. Abdominal:      Palpations: Abdomen is soft. Tenderness: There is no abdominal tenderness. Musculoskeletal: Normal range of motion. General: Deformity present. No tenderness. Swelling: left upper arm posterior lipoma soft mass palpable approx 3 inches in size. Right lower leg: No edema. Left lower leg: No edema. Lymphadenopathy:      Cervical: No cervical adenopathy. Skin:     General: Skin is warm and dry. Neurological:      Mental Status: She is alert and oriented to person, place, and time. Psychiatric:         Behavior: Behavior normal.         Thought Content:  Thought content normal.         Judgment: Judgment normal.       /80 (Site: Right Upper Arm, Position: Sitting, Cuff Size: Medium Adult)   Pulse 94   Wt 155 lb (70.3 kg)   SpO2 99%   BMI 29.29 kg/m²     Data:     Lab Results   Component Value Date     08/25/2020    K 3.4 08/25/2020    CL 96 08/25/2020    CO2 29 08/25/2020    BUN 8 08/25/2020    CREATININE 0.70 08/25/2020    GLUCOSE 107 08/25/2020    PROT 8.6 08/25/2020    LABALBU 4.8 08/25/2020    BILITOT 0.45 08/25/2020    ALKPHOS 73 08/25/2020    AST 18 08/25/2020    ALT 12 08/25/2020     No results found for: WBC, RBC, HGB, HCT, MCV, MCH, MCHC, RDW, PLT, MPV  Lab Results   Component Value Date    TSH 1.75 01/08/2019 Lab Results   Component Value Date    CHOL 215 08/25/2020    CHOL 226 05/04/2017    HDL 56 08/25/2020          Assessment & Plan       1. Preop general physical exam    Cleared for surgery   2. Lipoma of left upper extremity      3. Essential hypertension    Controlled on diltiazem and HCTZ     4. Anxiety  Controlled with wellbutrin 150 mg SR bid pt offers only taking in am  Quit smoking 1/2021. Completed Refills   Requested Prescriptions      No prescriptions requested or ordered in this encounter     No follow-ups on file. No orders of the defined types were placed in this encounter. No orders of the defined types were placed in this encounter. Frida Garcia received counseling on the following healthy behaviors: nutrition, exercise and medication adherence  Reviewed prior labs and health maintenance. Continue current medications, diet and exercise. Discussed use, benefit, and side effects of prescribed medications. Barriers to medication compliance addressed. Patient given educational materials - see patient instructions. All patient questions answered. Patient voiced understanding. On this date 3/1/2021 I have spent 21 minutes reviewing previous notes, test results and face to face with the patient discussing the diagnosis and importance of compliance with the treatment plan as well as documenting on the day of the visit.      Electronically signed by BRUNA Yeh CNP on 3/2/2021 at 4:47 PM

## 2021-03-02 ASSESSMENT — ENCOUNTER SYMPTOMS
EYES NEGATIVE: 1
ABDOMINAL DISTENTION: 0
RHINORRHEA: 0
SHORTNESS OF BREATH: 0
SORE THROAT: 0
COUGH: 0

## 2021-09-20 ENCOUNTER — OFFICE VISIT (OUTPATIENT)
Dept: FAMILY MEDICINE CLINIC | Age: 41
End: 2021-09-20
Payer: COMMERCIAL

## 2021-09-20 VITALS
DIASTOLIC BLOOD PRESSURE: 70 MMHG | BODY MASS INDEX: 27.19 KG/M2 | OXYGEN SATURATION: 99 % | SYSTOLIC BLOOD PRESSURE: 110 MMHG | HEART RATE: 90 BPM | WEIGHT: 144 LBS | HEIGHT: 61 IN

## 2021-09-20 DIAGNOSIS — I10 ESSENTIAL HYPERTENSION: ICD-10-CM

## 2021-09-20 DIAGNOSIS — F41.9 ANXIETY: ICD-10-CM

## 2021-09-20 DIAGNOSIS — Z13.220 SCREENING CHOLESTEROL LEVEL: ICD-10-CM

## 2021-09-20 DIAGNOSIS — Z00.00 ANNUAL PHYSICAL EXAM: Primary | ICD-10-CM

## 2021-09-20 PROCEDURE — 99395 PREV VISIT EST AGE 18-39: CPT | Performed by: NURSE PRACTITIONER

## 2021-09-20 RX ORDER — DESVENLAFAXINE 50 MG/1
TABLET, EXTENDED RELEASE ORAL
COMMUNITY
Start: 2021-09-01 | End: 2022-03-30

## 2021-09-20 RX ORDER — DILTIAZEM HYDROCHLORIDE 180 MG/1
CAPSULE, COATED, EXTENDED RELEASE ORAL
Qty: 90 CAPSULE | Refills: 3 | Status: SHIPPED | OUTPATIENT
Start: 2021-09-20 | End: 2022-09-20 | Stop reason: SDUPTHER

## 2021-09-20 RX ORDER — HYDROCHLOROTHIAZIDE 25 MG/1
TABLET ORAL
Qty: 90 TABLET | Refills: 3 | Status: SHIPPED | OUTPATIENT
Start: 2021-09-20 | End: 2022-09-08

## 2021-09-20 RX ORDER — BUSPIRONE HYDROCHLORIDE 5 MG/1
TABLET ORAL
COMMUNITY
Start: 2021-09-01 | End: 2022-03-30

## 2021-09-20 SDOH — ECONOMIC STABILITY: FOOD INSECURITY: WITHIN THE PAST 12 MONTHS, THE FOOD YOU BOUGHT JUST DIDN'T LAST AND YOU DIDN'T HAVE MONEY TO GET MORE.: NEVER TRUE

## 2021-09-20 SDOH — ECONOMIC STABILITY: FOOD INSECURITY: WITHIN THE PAST 12 MONTHS, YOU WORRIED THAT YOUR FOOD WOULD RUN OUT BEFORE YOU GOT MONEY TO BUY MORE.: NEVER TRUE

## 2021-09-20 ASSESSMENT — SOCIAL DETERMINANTS OF HEALTH (SDOH): HOW HARD IS IT FOR YOU TO PAY FOR THE VERY BASICS LIKE FOOD, HOUSING, MEDICAL CARE, AND HEATING?: NOT HARD AT ALL

## 2021-09-20 NOTE — PROGRESS NOTES
2021    Jackeline Redmond (:  1980) is a 46 y.o. female, here for a preventive medicine evaluation. Patient Active Problem List   Diagnosis    Hypertension    Rosacea    Smoker    Unequal pupils     46year old female  Annual physical     htn-well-controlled with no tacky rhythms continues on Cardizem and hydrochlorothiazide with good control  And has had recent weight loss of 10 pounds she will contact office if she has more than 20 pound weight loss total to see if her blood pressure medications need adjusted at that time    Patient continues to avoid nicotine use    Nickie Lamp: pristiq, buspar recent starts patient is using the BuSpar just as needed Pristiq is working well    Patient works at [employer] did have history of Covid infection, pleated both Covid vaccinations    Patient with history of left shoulder mass removed by plastics healing scar present doing well with full range of motion    Review of Systems   Constitutional: Negative for activity change, chills, diaphoresis and fever. HENT: Negative for congestion and postnasal drip. Eyes: Negative. Respiratory: Negative for cough and shortness of breath. Cardiovascular: Negative for chest pain, palpitations and leg swelling. Gastrointestinal: Negative for abdominal distention. Endocrine: Negative for cold intolerance and heat intolerance. Genitourinary: Negative for difficulty urinating. Musculoskeletal: Negative for arthralgias. Skin: Negative for rash. Neurological: Negative for dizziness and headaches. Psychiatric/Behavioral: Negative for sleep disturbance. The patient is not nervous/anxious. Prior to Visit Medications    Medication Sig Taking?  Authorizing Provider   desvenlafaxine succinate (PRISTIQ) 50 MG TB24 extended release tablet TAKE 1 TABLET BY MOUTH once a day Yes Historical Provider, MD   busPIRone (BUSPAR) 5 MG tablet TAKE 1/2 (ONE-HALF) TO 1 (ONE) TABLET BY MOUTH every night AS NEEDED Yes Historical Provider, MD   hydroCHLOROthiazide (HYDRODIURIL) 25 MG tablet TAKE 1 TABLET DAILY Yes BRUNA Mccarthy CNP   dilTIAZem (CARDIZEM CD) 180 MG extended release capsule TAKE 1 CAPSULE DAILY Yes BRUNA Mccarthy CNP        No Known Allergies    Past Medical History:   Diagnosis Date    Benign essential HTN     Chicken pox     as child    COVID-19 2020    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 Past Surgical History:   Procedure Laterality Date     SECTION      Dr. Mima Capone Right 2013    SALPINGECTOMY Bilateral 2016       Social History     Socioeconomic History    Marital status:      Spouse name: Erin Shetty Number of children: 1    Years of education: 15    Highest education level: Not on file   Occupational History    Not on file   Tobacco Use    Smoking status: Former Smoker     Packs/day: 0.50     Types: Cigarettes     Quit date: 2019     Years since quittin.6    Smokeless tobacco: Never Used   Substance and Sexual Activity    Alcohol use: Yes     Comment: rarely    Drug use: No    Sexual activity: Not on file   Other Topics Concern    Not on file   Social History Narrative    Not on file     Social Determinants of Health     Financial Resource Strain: Low Risk     Difficulty of Paying Living Expenses: Not hard at all   Food Insecurity: No Food Insecurity    Worried About [address] in the Last Year: Never true    [address]  in the Last Year: Never true   Transportation Needs:     Lack of Transportation (Medical):      Lack of Transportation (Non-Medical):    Physical Activity:     Days of Exercise per Week:     Minutes of Exercise per Session:    Stress:     Feeling of Stress :    Social Connections:     Frequency of Communication with Friends and Family:     Frequency of Social Gatherings with Friends and Family:     Attends [employer] Services:  Active Member of Clubs or Organizations:     Attends Club or Organization Meetings:     Marital Status:    Intimate Partner Violence:     Fear of Current or Ex-Partner:     Emotionally Abused:     Physically Abused:     Sexually Abused:         Family History   Problem Relation Age of Onset    Hypertension Mother     Depression Mother     Other Mother 61        alpha 1 antitrypsin deficiency     Other Father         essential tremor maeve upper extremities    Other Brother         familial tremor    Heart Disease Brother         valve disorder    Hypertension Maternal Grandmother     Emphysema Maternal Grandfather         smoker    Other Paternal Grandmother         sleep apnea    Heart Disease Paternal Grandfather         11 stents,     Other Paternal Grandfather         back problems nerve stimulator       ADVANCE DIRECTIVE: N, <no information>    Vitals:    09/20/21 1404   BP: 110/70   Site: Left Upper Arm   Position: Sitting   Cuff Size: Medium Adult   Pulse: 90   SpO2: 99%   Weight: 154 lb (69.9 kg)   Height: 5' 1\" (1.549 m)     Estimated body mass index is 29.1 kg/m² as calculated from the following:    Height as of this encounter: 5' 1\" (1.549 m). Weight as of this encounter: 154 lb (69.9 kg). Physical Exam  Vitals and nursing note reviewed. Constitutional:       General: She is not in acute distress. Appearance: Normal appearance. She is well-developed. HENT:      Head: Normocephalic and atraumatic. Right Ear: Tympanic membrane and external ear normal.      Left Ear: Tympanic membrane and external ear normal.      Mouth/Throat:      Mouth: Mucous membranes are moist.   Eyes:      General: No scleral icterus. Pupils: Pupils are equal, round, and reactive to light. Neck:      Vascular: No carotid bruit (neg maeve). Cardiovascular:      Rate and Rhythm: Normal rate and regular rhythm. Heart sounds: Normal heart sounds. No murmur heard.      Pulmonary: Effort: Pulmonary effort is normal. No respiratory distress. Breath sounds: Normal breath sounds. No wheezing. Abdominal:      Palpations: Abdomen is soft. Tenderness: There is no abdominal tenderness. Musculoskeletal:         General: Normal range of motion. Cervical back: Normal range of motion and neck supple. Right lower leg: No edema. Left lower leg: No edema. Lymphadenopathy:      Cervical: No cervical adenopathy. Skin:     General: Skin is warm and dry. Neurological:      Mental Status: She is alert and oriented to person, place, and time. Psychiatric:         Behavior: Behavior normal.         Thought Content: Thought content normal.         Judgment: Judgment normal.         No flowsheet data found. Lab Results   Component Value Date    CHOL 215 08/25/2020    CHOL 226 05/04/2017    CHOL 214 01/19/2016    CHOL 214 08/14/2013    TRIG 110 08/25/2020    TRIG 125 05/04/2017    TRIG 199 01/19/2016    HDL 56 08/25/2020    HDL 55 05/04/2017    HDL 50 01/19/2016    LDLCHOLESTEROL 137 08/25/2020    LDLCHOLESTEROL 124 01/19/2016    LDLCHOLESTEROL 140 08/14/2013    LDLCALC 146 05/04/2017    GLUCOSE 107 08/25/2020       The ASCVD Risk score (Ying Coreas et al., 2013) failed to calculate for the following reasons:     The 2013 ASCVD risk score is only valid for ages 36 to 78    Immunization History   Administered Date(s) Administered    COVID-19, Moderna, PF, 100mcg/0.5mL 04/19/2021, 05/18/2021    Hepatitis B Adult (Recombivax HB) 08/15/2014    Hepatitis B Ped/Adol (Engerix-B, Recombivax HB) 04/04/2014    Pneumococcal Polysaccharide ([id number]) 08/07/2017    Tdap (Boostrix, Adacel) 05/19/2013, 08/15/2014    Varicella (Varivax) 08/15/2014       Health Maintenance   Topic Date Due    Hepatitis C screen  Never done    HIV screen  Never done    Varicella vaccine (2 of 2 - 13+ 2-dose series) 09/12/2014    Potassium monitoring  08/25/2021    Creatinine monitoring  08/25/2021  Flu vaccine (1) Never done    Cervical cancer screen  09/02/2021    DTaP/Tdap/Td vaccine (3 - Td or Tdap) 08/15/2024    COVID-19 Vaccine  Completed    Hepatitis A vaccine  Aged Out    Hepatitis B vaccine  Aged Out    Hib vaccine  Aged Out    Meningococcal (ACWY) vaccine  Aged Out    Pneumococcal 0-64 years Vaccine  Aged Out          ASSESSMENT/PLAN:  1. Essential hypertension  -     dilTIAZem (CARDIZEM CD) 180 MG extended release capsule; TAKE 1 CAPSULE DAILY, Disp-90 capsule, R-3Normal      No follow-ups on file. An electronic signature was used to authenticate this note.     --BRUNA Chung - CNP on 9/20/2021 at 2:16 PM

## 2021-09-21 ASSESSMENT — ENCOUNTER SYMPTOMS
ABDOMINAL DISTENTION: 0
EYES NEGATIVE: 1
SHORTNESS OF BREATH: 0
COUGH: 0

## 2022-03-29 ENCOUNTER — HOSPITAL ENCOUNTER (OUTPATIENT)
Age: 42
Discharge: HOME | End: 2022-03-29
Payer: COMMERCIAL

## 2022-03-29 DIAGNOSIS — Z13.220 SCREENING CHOLESTEROL LEVEL: ICD-10-CM

## 2022-03-29 DIAGNOSIS — I10 ESSENTIAL HYPERTENSION: ICD-10-CM

## 2022-03-29 DIAGNOSIS — Z00.00 ANNUAL PHYSICAL EXAM: ICD-10-CM

## 2022-03-29 LAB
ABSOLUTE EOS #: 0.1 K/UL (ref 0–0.4)
ABSOLUTE LYMPH #: 1.9 K/UL (ref 1–4.8)
ABSOLUTE MONO #: 0.4 K/UL (ref 0–1)
ALBUMIN SERPL-MCNC: 4.8 G/DL (ref 3.5–5.2)
ALP BLD-CCNC: 53 U/L (ref 35–104)
ALT SERPL-CCNC: 15 U/L (ref 5–33)
ANION GAP SERPL CALCULATED.3IONS-SCNC: 12 MMOL/L (ref 9–17)
AST SERPL-CCNC: 18 U/L
BASOPHILS # BLD: 0 % (ref 0–2)
BASOPHILS ABSOLUTE: 0 K/UL (ref 0–0.2)
BILIRUB SERPL-MCNC: 0.53 MG/DL (ref 0.3–1.2)
BUN BLDV-MCNC: 12 MG/DL (ref 6–20)
BUN/CREAT BLD: 19 (ref 9–20)
CALCIUM SERPL-MCNC: 9.6 MG/DL (ref 8.6–10.4)
CHLORIDE BLD-SCNC: 96 MMOL/L (ref 98–107)
CHOLESTEROL/HDL RATIO: 4
CHOLESTEROL: 246 MG/DL
CO2: 25 MMOL/L (ref 20–31)
CREAT SERPL-MCNC: 0.64 MG/DL (ref 0.5–0.9)
DIFFERENTIAL TYPE: YES
EOSINOPHILS RELATIVE PERCENT: 1 % (ref 0–5)
GFR AFRICAN AMERICAN: >60 ML/MIN
GFR NON-AFRICAN AMERICAN: >60 ML/MIN
GFR SERPL CREATININE-BSD FRML MDRD: ABNORMAL ML/MIN/{1.73_M2}
GLUCOSE BLD-MCNC: 103 MG/DL (ref 70–99)
HCT VFR BLD CALC: 39.8 % (ref 36–46)
HDLC SERPL-MCNC: 62 MG/DL
HEMOGLOBIN: 13.9 G/DL (ref 12–16)
LDL CHOLESTEROL: 158 MG/DL (ref 0–130)
LYMPHOCYTES # BLD: 32 % (ref 15–40)
MCH RBC QN AUTO: 32 PG (ref 26–34)
MCHC RBC AUTO-ENTMCNC: 34.9 G/DL (ref 31–37)
MCV RBC AUTO: 91.5 FL (ref 80–100)
MONOCYTES # BLD: 6 % (ref 4–8)
PATIENT FASTING?: YES
PDW BLD-RTO: 12.9 % (ref 12.1–15.2)
PLATELET # BLD: 383 K/UL (ref 140–450)
POTASSIUM SERPL-SCNC: 3.7 MMOL/L (ref 3.7–5.3)
RBC # BLD: 4.35 M/UL (ref 4–5.2)
SEG NEUTROPHILS: 61 % (ref 47–75)
SEGMENTED NEUTROPHILS ABSOLUTE COUNT: 3.6 K/UL (ref 2.5–7)
SODIUM BLD-SCNC: 133 MMOL/L (ref 135–144)
TOTAL PROTEIN: 7.1 G/DL (ref 6.4–8.3)
TRIGL SERPL-MCNC: 128 MG/DL
WBC # BLD: 5.9 K/UL (ref 3.5–11)

## 2022-03-29 PROCEDURE — 85025 COMPLETE CBC W/AUTO DIFF WBC: CPT

## 2022-03-29 PROCEDURE — 36415 COLL VENOUS BLD VENIPUNCTURE: CPT

## 2022-03-29 PROCEDURE — 84443 ASSAY THYROID STIM HORMONE: CPT

## 2022-03-29 PROCEDURE — 80053 COMPREHEN METABOLIC PANEL: CPT

## 2022-03-29 PROCEDURE — 80061 LIPID PANEL: CPT

## 2022-03-30 ENCOUNTER — OFFICE VISIT (OUTPATIENT)
Dept: PRIMARY CARE CLINIC | Age: 42
End: 2022-03-30
Payer: COMMERCIAL

## 2022-03-30 VITALS
OXYGEN SATURATION: 98 % | HEIGHT: 61 IN | SYSTOLIC BLOOD PRESSURE: 120 MMHG | BODY MASS INDEX: 29.07 KG/M2 | WEIGHT: 154 LBS | DIASTOLIC BLOOD PRESSURE: 78 MMHG | HEART RATE: 100 BPM

## 2022-03-30 DIAGNOSIS — I10 PRIMARY HYPERTENSION: ICD-10-CM

## 2022-03-30 DIAGNOSIS — Z13.29 SCREENING FOR THYROID DISORDER: ICD-10-CM

## 2022-03-30 LAB — TSH SERPL DL<=0.05 MIU/L-ACNC: 2.01 UIU/ML (ref 0.3–5)

## 2022-03-30 PROCEDURE — 99213 OFFICE O/P EST LOW 20 MIN: CPT | Performed by: NURSE PRACTITIONER

## 2022-03-30 ASSESSMENT — PATIENT HEALTH QUESTIONNAIRE - PHQ9
2. FEELING DOWN, DEPRESSED OR HOPELESS: 0
SUM OF ALL RESPONSES TO PHQ QUESTIONS 1-9: 0
1. LITTLE INTEREST OR PLEASURE IN DOING THINGS: 0
SUM OF ALL RESPONSES TO PHQ QUESTIONS 1-9: 0
SUM OF ALL RESPONSES TO PHQ9 QUESTIONS 1 & 2: 0
SUM OF ALL RESPONSES TO PHQ QUESTIONS 1-9: 0
SUM OF ALL RESPONSES TO PHQ QUESTIONS 1-9: 0

## 2022-03-30 NOTE — PATIENT INSTRUCTIONS
You may be receiving a survey from [employer] regarding your visit today. You may get this in the mail, through your MyChart or in your email. Please complete the survey to enable us to provide the highest quality of care to you and your family. If you cannot score us as very good ( 5 Stars) on any question, please feel free to call the office to discuss how we could have made your experience exceptional.     Thank You! BRUNA Velasquez-CNP  [address] [place]  [place]        Phone: [phone]  Fax: [facility] [place] Office Hours:  Monday: [facility] office location [phone] ([phone]) Offering additional late hours the first Monday of the month until 7 pm.   Tuesday: 8-5 Wednesday: 8-5 Thursday:  Additional hours offered 2 Thursdays a month. Please call to inquire those dates. Fridays: 7:30-4:30  Patient Education        Learning About Diuretics for High Blood Pressure  Overview  Diuretics help to lower blood pressure. This reduces your risk of a heartattack and stroke. It also reduces your risk of kidney disease. Diuretics cause your kidneys to remove sodium and water. They also relax theblood vessel walls. These help lower your blood pressure. Examples   Chlorthalidone   Hydrochlorothiazide  Possible side effects  There are some common side effects. They are:   Too little potassium.  Feeling dizzy.  Rash.  Urinating a lot.  High blood sugar. (But this is not common.)  You may have other side effects. Check the information that comes with yourmedicine. What to know about taking this medicine   You may take other medicines for blood pressure. Diuretics can help those work better. They can also prevent extra fluid in your body.  You may need to take potassium pills. Ask your doctor about this.  You may need blood tests to check on your health. For example, you may have tests to check your kidneys and your potassium level.    Take your medicines exactly as prescribed. Call your doctor if you think you are having a problem with your medicine.  Check with your doctor or pharmacist before you use any other medicines. This includes over-the-counter medicines. Make sure your doctor knows all of the medicines, vitamins, herbal products, and supplements you take. Taking some medicines together can cause problems. Where can you learn more? Go to [email or web address] and sign in to your [employer] account. Enter J569 in the [employer] box to learn more about \"Learning About Diuretics for High Blood Pressure. \"     If you do not have an account, please click on the \"Sign Up Now\" link. Current as of: January 10, 2022               Content Version: 13.2  © 2006-2022 Healthwise, Incorporated. Care instructions adapted under license by [facility] ([facility]). If you have questions about a medical condition or this instruction, always ask your healthcare professional. [place] [address] any warranty or liability for your use of this information.

## 2022-03-30 NOTE — PROGRESS NOTES
[facility] [address] [address]  Dept: [phone]     Subjective:  Glenroy Orozco is a 38 y.o. female presenting today for routine follow up of hypertension, hyperlipidemia, hypothyroidism and impaired fasting glucose and recent labs. She denies chest pain, shortness of breath or palpitations. She denies headaches, vision changes and lightheadedness. She denies myalgias. She denies numbness and tingling in the hands and feet. She has been compliant with diet and exercise. She has been compliant with her medications. She is tolerating medications. Pt with illness in January, positive covid-19  full 10 days off work, 2nd time positive for covid 19       · Hypertension: well controlled with current medications   · Medications: continue current medication doses   Eye exam done wears contacts and glasses     The 10-year ASCVD risk score (Felisa Gillis et al., 2013) is: 0.8%    Values used to calculate the score:      Age: 38 years      Sex: Female      Is Non- : No      Diabetic: No      Tobacco smoker: No      Systolic Blood Pressure: 706 mmHg      Is BP treated: Yes      HDL Cholesterol: 62 mg/dL      Total Cholesterol: 246 mg/dL    · Hyperlipidemia: fairly well controlled   · Medications: discussed < 1% risk,    · Recommended low cholesterol diet       · Impaired fasting glucose:  stable  · Recommend low carb diet    · Nutrition Status:  overweight  BMI 29    · Recommend continue current healthy lifestyle with diet and regular exercise         CURRENT ALLERGIES: Patient has no known allergies. SOCIAL HISTORY:   Social History     Tobacco Use    Smoking status: Former Smoker     Packs/day: 0.50     Types: Cigarettes     Quit date: 1/11/2019     Years since quitting: 3.2    Smokeless tobacco: Never Used   Substance Use Topics    Alcohol use: Yes     Comment: rarely    Drug use:  No [facility]       Medication List          Accurate as of March 30, 2022 11:59 PM. If you have any questions, ask your nurse or doctor. CONTINUE taking these medications    dilTIAZem 180 MG extended release capsule  Commonly known as: CARDIZEM CD  TAKE 1 CAPSULE DAILY     hydroCHLOROthiazide 25 MG tablet  Commonly known as: HYDRODIURIL  TAKE 1 TABLET DAILY               Review of Systems   Constitutional: Negative for activity change, chills and fever. HENT: Negative for congestion and sore throat. Eyes: Negative. Respiratory: Negative for cough. Cardiovascular: Negative for chest pain and leg swelling. Gastrointestinal: Negative for abdominal distention and constipation. Endocrine: Negative for cold intolerance and heat intolerance. Genitourinary: Negative for difficulty urinating. Musculoskeletal: Negative for arthralgias. Objective:  /78   Pulse 100   Ht 5' 1\" (1.549 m)   Wt 154 lb (69.9 kg)   SpO2 98%   BMI 29.10 kg/m²   Physical Exam  Vitals and nursing note reviewed. Constitutional:       General: She is not in acute distress. Appearance: Normal appearance. She is well-developed. HENT:      Head: Normocephalic and atraumatic. Right Ear: External ear normal.      Left Ear: Tympanic membrane and external ear normal.      Nose: No congestion. Mouth/Throat:      Mouth: Mucous membranes are moist.      Pharynx: No posterior oropharyngeal erythema. Eyes:      General: No scleral icterus. Pupils: Pupils are equal, round, and reactive to light. Neck:      Vascular: No carotid bruit (neg maeve). Cardiovascular:      Rate and Rhythm: Normal rate and regular rhythm. Heart sounds: Normal heart sounds. No murmur heard. Pulmonary:      Effort: Pulmonary effort is normal. No respiratory distress. Breath sounds: Normal breath sounds. No wheezing. Abdominal:      Palpations: Abdomen is soft. Tenderness:  There is no abdominal tenderness. Musculoskeletal:         General: Normal range of motion. Cervical back: Normal range of motion and neck supple. Right lower leg: No edema. Left lower leg: No edema. Lymphadenopathy:      Cervical: No cervical adenopathy. Skin:     General: Skin is warm and dry. Neurological:      Mental Status: She is alert and oriented to person, place, and time. Psychiatric:         Behavior: Behavior normal.         Thought Content: Thought content normal.         Judgment: Judgment normal.           Diagnostic Data:  Lab Results   Component Value Date    GLUCOSE 103 (H) 03/29/2022     Lab Results   Component Value Date    BUN 12 03/29/2022    CREATININE 0.64 03/29/2022     (L) 03/29/2022    K 3.7 03/29/2022    CALCIUM 9.6 03/29/2022    CL 96 (L) 03/29/2022    CO2 25 03/29/2022    LABGLOM >60 03/29/2022     Lab Results   Component Value Date    CHOL 246 (H) 03/29/2022    HDL 62 03/29/2022    LDLCHOLESTEROL 158 (H) 03/29/2022    LDLCALC 146 05/04/2017    TRIG 128 03/29/2022    ALT 15 03/29/2022    AST 18 03/29/2022     Lab Results   Component Value Date    TSH 2.01 03/29/2022    THYROXINE 1.18 01/19/2016     1. BMI 29.0-29.9,adult    - TSH with Reflex; Future    2. Primary hypertension    - Basic Metabolic Panel; Future    3. Screening for thyroid disorder    - TSH with Reflex;  Future        · Health Maintenance:  · Discussed health maintenance issues: patient is up to date for health maintenance items  · Discussed immunization schedule: patient is up to date for vaccinations    · Follow Up  · Labs: BMP, A1c, fasting lipids, LFT's and thyroid levels to be drawn in 6 months  · Follow up appt to be scheduled in 6 months to f/u labs    Electronically signed by BRUNA Mckinney CNP on 4/1/2022 at 10:55 PM

## 2022-04-01 ASSESSMENT — ENCOUNTER SYMPTOMS
EYES NEGATIVE: 1
ABDOMINAL DISTENTION: 0
SORE THROAT: 0
CONSTIPATION: 0
COUGH: 0

## 2022-09-06 DIAGNOSIS — Z00.00 ANNUAL PHYSICAL EXAM: ICD-10-CM

## 2022-09-08 RX ORDER — HYDROCHLOROTHIAZIDE 25 MG/1
TABLET ORAL
Qty: 90 TABLET | Refills: 3 | Status: SHIPPED | OUTPATIENT
Start: 2022-09-08

## 2022-09-20 DIAGNOSIS — I10 ESSENTIAL HYPERTENSION: ICD-10-CM

## 2022-09-20 DIAGNOSIS — Z00.00 ANNUAL PHYSICAL EXAM: ICD-10-CM

## 2022-09-20 RX ORDER — DILTIAZEM HYDROCHLORIDE 180 MG/1
CAPSULE, COATED, EXTENDED RELEASE ORAL
Qty: 90 CAPSULE | Refills: 3 | Status: SHIPPED | OUTPATIENT
Start: 2022-09-20

## 2022-09-20 RX ORDER — DILTIAZEM HYDROCHLORIDE 180 MG/1
CAPSULE, COATED, EXTENDED RELEASE ORAL
Qty: 90 CAPSULE | Refills: 3 | Status: CANCELLED | OUTPATIENT
Start: 2022-09-20

## 2022-10-04 ENCOUNTER — OFFICE VISIT (OUTPATIENT)
Dept: PRIMARY CARE CLINIC | Age: 42
End: 2022-10-04
Payer: COMMERCIAL

## 2022-10-04 ENCOUNTER — HOSPITAL ENCOUNTER (OUTPATIENT)
Age: 42
Setting detail: SPECIMEN
Discharge: HOME | End: 2022-10-04
Payer: COMMERCIAL

## 2022-10-04 VITALS
OXYGEN SATURATION: 97 % | DIASTOLIC BLOOD PRESSURE: 88 MMHG | WEIGHT: 160 LBS | BODY MASS INDEX: 30.23 KG/M2 | SYSTOLIC BLOOD PRESSURE: 136 MMHG | HEART RATE: 97 BPM

## 2022-10-04 DIAGNOSIS — F32.A ANXIETY AND DEPRESSION: ICD-10-CM

## 2022-10-04 DIAGNOSIS — F41.9 ANXIETY AND DEPRESSION: ICD-10-CM

## 2022-10-04 DIAGNOSIS — I10 PRIMARY HYPERTENSION: ICD-10-CM

## 2022-10-04 DIAGNOSIS — Z00.00 ENCOUNTER FOR WELL ADULT EXAM WITHOUT ABNORMAL FINDINGS: Primary | ICD-10-CM

## 2022-10-04 DIAGNOSIS — Z86.79 HX OF SUPRAVENTRICULAR TACHYCARDIA: ICD-10-CM

## 2022-10-04 DIAGNOSIS — Z12.31 BREAST CANCER SCREENING BY MAMMOGRAM: ICD-10-CM

## 2022-10-04 DIAGNOSIS — Z13.29 SCREENING FOR THYROID DISORDER: ICD-10-CM

## 2022-10-04 LAB
ANION GAP SERPL CALCULATED.3IONS-SCNC: 13 MMOL/L (ref 9–17)
BUN BLDV-MCNC: 9 MG/DL (ref 6–20)
BUN/CREAT BLD: 13 (ref 9–20)
CALCIUM SERPL-MCNC: 10.6 MG/DL (ref 8.6–10.4)
CHLORIDE BLD-SCNC: 94 MMOL/L (ref 98–107)
CO2: 27 MMOL/L (ref 20–31)
CREAT SERPL-MCNC: 0.67 MG/DL (ref 0.5–0.9)
GFR SERPL CREATININE-BSD FRML MDRD: >60 ML/MIN/1.73M2
GLUCOSE BLD-MCNC: 97 MG/DL (ref 70–99)
POTASSIUM SERPL-SCNC: 3.8 MMOL/L (ref 3.7–5.3)
SODIUM BLD-SCNC: 134 MMOL/L (ref 135–144)
TSH SERPL DL<=0.05 MIU/L-ACNC: 1.74 UIU/ML (ref 0.3–5)

## 2022-10-04 PROCEDURE — 80048 BASIC METABOLIC PNL TOTAL CA: CPT

## 2022-10-04 PROCEDURE — 84443 ASSAY THYROID STIM HORMONE: CPT

## 2022-10-04 PROCEDURE — 99396 PREV VISIT EST AGE 40-64: CPT | Performed by: NURSE PRACTITIONER

## 2022-10-04 RX ORDER — BUPROPION HYDROCHLORIDE 75 MG/1
75 TABLET ORAL 2 TIMES DAILY
Qty: 60 TABLET | Refills: 0 | Status: SHIPPED | OUTPATIENT
Start: 2022-10-04

## 2022-10-04 SDOH — ECONOMIC STABILITY: FOOD INSECURITY: WITHIN THE PAST 12 MONTHS, YOU WORRIED THAT YOUR FOOD WOULD RUN OUT BEFORE YOU GOT MONEY TO BUY MORE.: NEVER TRUE

## 2022-10-04 SDOH — ECONOMIC STABILITY: FOOD INSECURITY: WITHIN THE PAST 12 MONTHS, THE FOOD YOU BOUGHT JUST DIDN'T LAST AND YOU DIDN'T HAVE MONEY TO GET MORE.: NEVER TRUE

## 2022-10-04 ASSESSMENT — SOCIAL DETERMINANTS OF HEALTH (SDOH): HOW HARD IS IT FOR YOU TO PAY FOR THE VERY BASICS LIKE FOOD, HOUSING, MEDICAL CARE, AND HEATING?: NOT HARD AT ALL

## 2022-10-04 ASSESSMENT — ENCOUNTER SYMPTOMS
DIARRHEA: 0
ABDOMINAL DISTENTION: 0
SHORTNESS OF BREATH: 0
EYES NEGATIVE: 1
COUGH: 0
RHINORRHEA: 0

## 2022-10-04 NOTE — PROGRESS NOTES
Well Adult Note  Name: Emery Sebastian Date: 10/4/2022   MRN: [id number] Sex: Female   Age: 38 y.o. Ethnicity: Non- / Non    : 1980 Race: White (non-)      Kimberly Gunn is here for well adult exam.  History:    38year old female  Annual physical     Social media let most of it go. Denies need for counseling  Sleeping okay.        Htn- on cardizem , SVT     Wt Readings from Last 3 Encounters:   10/04/22 160 lb (72.6 kg)   22 154 lb (69.9 kg)   21 144 lb (65.3 kg)     1st mammogram for breast cancer screening ordered today patient denies any trouble in this area does self breast exams has a second cousin to her mom that has breast cancer at a young age she will try to find out more details of the type and also if there is any genetic traits on her side    And follows with Dr. Melani Batista for her Paps  She has a history of a tubal ligation    She has felt more emotional lately with the behaviors of her teen daughter who is 13 trying to sneak out of the house having challenges with placing set barriers and control areas they have put up cameras in their home to help and are trying to have a conversation with her child  Overall she feels like she nose from right to wrong okay does not make the best choices at times and is scheduled to go into counseling soon    Patient denies any skin lesions of concern    Like to start on medication of Wellbutrin which she took for smoking cessation a couple years ago that worked well with both anxiety and depression patient is taking very little time for herself I encouraged her to schedule some time with her friends and also some events on things she enjoys also    Her sleep is disturbed with her child's recent events she is going to see me back in 4 weeks to reevaluate and go to once a day medicine hopefully  Patient denies any suicidal ideations          Review of Systems   Constitutional:  Negative for activity change, chills and fever. HENT:  Negative for congestion and rhinorrhea. Eyes: Negative. Respiratory:  Negative for cough and shortness of breath. Cardiovascular:  Negative for chest pain and leg swelling. Gastrointestinal:  Negative for abdominal distention and diarrhea. Endocrine: Negative for cold intolerance and polydipsia. Genitourinary:  Negative for difficulty urinating. Musculoskeletal:  Negative for arthralgias. Skin:  Negative for rash. Neurological:  Negative for dizziness and headaches. Psychiatric/Behavioral:  Positive for decreased concentration and sleep disturbance. Negative for self-injury and suicidal ideas. The patient is nervous/anxious. No Known Allergies      Prior to Visit Medications    Medication Sig Taking? 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         Past Surgical History:   Procedure Laterality Date     SECTION      Dr. Cristiano Torres Right 2013    SALPINGECTOMY Bilateral 2016         Family History   Problem Relation Age of Onset    Hypertension Mother     Depression Mother     Other Mother 61        alpha 1 antitrypsin deficiency     Other Father         essential tremor maeve upper extremities    Other Brother         familial tremor    Heart Disease Brother         valve disorder    Hypertension Maternal Grandmother     Emphysema Maternal Grandfather         smoker    Other Paternal Grandmother sleep apnea    Heart Disease Paternal Grandfather         11 stents,     Other Paternal Grandfather         back problems nerve stimulator       Social History     Tobacco Use    Smoking status: Former     Packs/day: 0.50     Types: Cigarettes     Quit date: 1/11/2019     Years since quitting: 3.7    Smokeless tobacco: Never   Substance Use Topics    Alcohol use: Yes     Comment: rarely    Drug use: No       Objective   /88   Pulse 97   Wt 160 lb (72.6 kg)   SpO2 97%   BMI 30.23 kg/m²   Wt Readings from Last 3 Encounters:   10/04/22 160 lb (72.6 kg)   03/30/22 154 lb (69.9 kg)   09/20/21 144 lb (65.3 kg)     There were no vitals filed for this visit. Physical Exam  Vitals and nursing note reviewed. Constitutional:       General: She is not in acute distress. Appearance: Normal appearance. She is well-developed. HENT:      Head: Normocephalic and atraumatic. Right Ear: Tympanic membrane and external ear normal.      Left Ear: Tympanic membrane and external ear normal.      Nose: No congestion. Mouth/Throat:      Mouth: Mucous membranes are moist.      Pharynx: No oropharyngeal exudate. Eyes:      General: No scleral icterus. Pupils: Pupils are equal, round, and reactive to light. Neck:      Vascular: No carotid bruit (neg maeve). Cardiovascular:      Rate and Rhythm: Normal rate and regular rhythm. Heart sounds: Normal heart sounds. No murmur heard. Pulmonary:      Effort: Pulmonary effort is normal. No respiratory distress. Breath sounds: Normal breath sounds. No wheezing. Abdominal:      Palpations: Abdomen is soft. Tenderness: There is no abdominal tenderness. Musculoskeletal:         General: Normal range of motion. Cervical back: Normal range of motion and neck supple. Right lower leg: No edema. Left lower leg: No edema. Lymphadenopathy:      Cervical: No cervical adenopathy. Skin:     General: Skin is warm and dry.    Neurological: Mental Status: She is alert and oriented to person, place, and time. Psychiatric:         Behavior: Behavior normal.         Thought Content: Thought content normal.         Judgment: Judgment normal.      Comments: Good eye contact , future oriented, no suicidal ideations. Assessment   Plan   1. Encounter for well adult exam without abnormal findings  -     [facility] KINZA DIGITAL SCREEN BILATERAL; Future  2. Anxiety and depression  -     buPROPion (WELLBUTRIN) 75 MG tablet; Take 1 tablet by mouth 2 times daily New start anxiety and depression, Disp-60 tablet, R-0Normal  3. Breast cancer screening by mammogram  -     [facility] KINZA DIGITAL SCREEN BILATERAL; Future  4.  Hx of supraventricular tachycardia       Personalized Preventive Plan   Current Health Maintenance Status  Immunization History   Administered Date(s) Administered    COVID-19, MODERNA BLUE border, Primary or Immunocompromised, (age 12y+), IM, 100 mcg/0.5mL 04/19/2021, 05/18/2021    Hepatitis B Adult (Recombivax HB) 08/15/2014    Hepatitis B Ped/Adol (Engerix-B, Recombivax HB) 04/04/2014    Pneumococcal Polysaccharide ([id number]) 08/07/2017    Tdap (Boostrix, Adacel) 05/19/2013, 08/15/2014    Varicella (Varivax) 08/15/2014        Health Maintenance   Topic Date Due    HIV screen  Never done    Hepatitis C screen  Never done    Hepatitis B vaccine (2 of 4 - 4-dose series) 09/12/2014    Varicella vaccine (2 of 2 - 13+ 2-dose series) 09/12/2014    Diabetes screen  Never done    COVID-19 Vaccine (3 - Booster for Moderna series) 10/18/2021    Flu vaccine (1) Never done    Depression Screen  03/30/2023    DTaP/Tdap/Td vaccine (3 - Td or Tdap) 08/15/2024    Cervical cancer screen  09/01/2024    Lipids  03/29/2027    Hepatitis A vaccine  Aged Out    Hib vaccine  Aged Out    Meningococcal (ACWY) vaccine  Aged Out    Pneumococcal 0-64 years Vaccine  Aged Out     Recommendations for ecomom Due: see orders and patient instructions/AVS.    Return in about 6 months (around 4/4/2023) for depression med check 1 month.

## 2022-10-05 ENCOUNTER — PATIENT MESSAGE (OUTPATIENT)
Dept: PRIMARY CARE CLINIC | Age: 42
End: 2022-10-05

## 2022-10-05 DIAGNOSIS — E83.52 SERUM CALCIUM ELEVATED: Primary | ICD-10-CM

## 2022-10-07 NOTE — TELEPHONE ENCOUNTER
From: Marixa Rojo  To: Igor Gao  Sent: 10/5/2022 3:59 PM EDT  Subject: F/U from visit and labs on 10/4/22. With the newly elevated calcium and the fatigue, insomnia, depression/anxiety could this be a parathyroid concern? Also, should I be expecting a call to set up the mammogram or do I need to call them?

## 2022-10-14 ENCOUNTER — HOSPITAL ENCOUNTER (OUTPATIENT)
Age: 42
Discharge: HOME | End: 2022-10-14
Payer: COMMERCIAL

## 2022-10-14 DIAGNOSIS — E83.52 SERUM CALCIUM ELEVATED: ICD-10-CM

## 2022-10-14 LAB
CALCIUM IONIZED: 1.3 MMOL/L (ref 1.13–1.33)
PTH INTACT: 17.4 PG/ML (ref 14–72)
VITAMIN D 25-HYDROXY: 20.6 NG/ML

## 2022-10-14 PROCEDURE — 36415 COLL VENOUS BLD VENIPUNCTURE: CPT

## 2022-10-14 PROCEDURE — 82330 ASSAY OF CALCIUM: CPT

## 2022-10-14 PROCEDURE — 83970 ASSAY OF PARATHORMONE: CPT

## 2022-10-14 PROCEDURE — 82306 VITAMIN D 25 HYDROXY: CPT

## 2022-11-11 ENCOUNTER — OFFICE VISIT (OUTPATIENT)
Dept: PRIMARY CARE CLINIC | Age: 42
End: 2022-11-11
Payer: COMMERCIAL

## 2022-11-11 VITALS
SYSTOLIC BLOOD PRESSURE: 122 MMHG | OXYGEN SATURATION: 98 % | HEART RATE: 99 BPM | WEIGHT: 163 LBS | DIASTOLIC BLOOD PRESSURE: 82 MMHG | BODY MASS INDEX: 30.8 KG/M2

## 2022-11-11 DIAGNOSIS — F41.9 ANXIETY AND DEPRESSION: Primary | ICD-10-CM

## 2022-11-11 DIAGNOSIS — E66.9 OBESITY (BMI 30.0-34.9): ICD-10-CM

## 2022-11-11 DIAGNOSIS — F32.A ANXIETY AND DEPRESSION: Primary | ICD-10-CM

## 2022-11-11 DIAGNOSIS — I10 ESSENTIAL HYPERTENSION: ICD-10-CM

## 2022-11-11 PROCEDURE — 3078F DIAST BP <80 MM HG: CPT | Performed by: NURSE PRACTITIONER

## 2022-11-11 PROCEDURE — 99213 OFFICE O/P EST LOW 20 MIN: CPT | Performed by: NURSE PRACTITIONER

## 2022-11-11 PROCEDURE — 3074F SYST BP LT 130 MM HG: CPT | Performed by: NURSE PRACTITIONER

## 2022-11-11 RX ORDER — BUPROPION HYDROCHLORIDE 150 MG/1
150 TABLET ORAL EVERY MORNING
Qty: 30 TABLET | Refills: 0 | Status: SHIPPED | OUTPATIENT
Start: 2022-11-11

## 2022-11-11 NOTE — PATIENT INSTRUCTIONS
Calorie [id number] per day  Dietician consult    Waist circumference  Weight    Meds:  Quiana Stockx- oral

## 2022-11-11 NOTE — PROGRESS NOTES
[facility] [address] [address]  Dept: [phone]     Subjective:  Peyton Cano is a 38 y.o. female presenting today for routine follow up of hypertension and impaired fasting glucose and recent labs. She denies chest pain, shortness of breath or palpitations. She denies headaches, vision changes and lightheadedness. She denies myalgias. She denies numbness and tingling in the hands and feet. She has been compliant with diet and exercise. She has been compliant with her medications. She is tolerating medications. Hypertension: well controlled with current medications   Medications: continue current medication doses     Anxiety and depression: started wellbutrin missed doses. Sleeping better, mood improved. Less tearful times. Feels overall better. Good eye contact , no suicidal ideations, plans with sports activities with basketball and volleyball. Currently on wellbutrin medication doing well, will change to once a day med, hx missed evening dose one time. Impaired fasting glucose:  stable with normal A1c  Recommend low carb diet    Nutrition Status:  well developed, well nourished   Recommend continue current healthy lifestyle with diet and regular exercise   Obesity with BMI- 30 discussed weight loss options- pt with hx SVT limited drugs for it. CURRENT ALLERGIES: Patient has no known allergies. SOCIAL HISTORY:   Social History     Tobacco Use    Smoking status: Former     Packs/day: 0.50     Types: Cigarettes     Quit date: 1/11/2019     Years since quitting: 3.8    Smokeless tobacco: Never   Substance Use Topics    Alcohol use: Yes     Comment: rarely    Drug use: No       HPI       Medication List            Accurate as of November 11, 2022 11:59 PM. If you have any questions, ask your nurse or doctor.                 START taking these medications      buPROPion 150 MG extended release tablet  Commonly known as: Wellbutrin XL  Take 1 tablet by mouth every morning Anxiety and depression  Started by: BRUNA Daley CNP            CONTINUE taking these medications      dilTIAZem 180 MG extended release capsule  Commonly known as: CARDIZEM CD  TAKE 1 CAPSULE DAILY     hydroCHLOROthiazide 25 MG tablet  Commonly known as: HYDRODIURIL  TAKE 1 TABLET BY MOUTH DAILY               Where to Get Your Medications        These medications were sent to [facility] [address] [place] [address]      Phone: [phone]   buPROPion 150 MG extended release tablet            Review of Systems   Constitutional:  Negative for activity change, chills and fever. HENT:  Negative for congestion, hearing loss, rhinorrhea and sore throat. Eyes: Negative. Respiratory:  Negative for cough and chest tightness. Gastrointestinal:  Negative for abdominal distention. Endocrine: Negative for cold intolerance and heat intolerance. Genitourinary:  Negative for difficulty urinating. Musculoskeletal:  Negative for arthralgias and back pain. Skin:  Negative for rash. Neurological:  Negative for dizziness and headaches. Psychiatric/Behavioral:  Negative for decreased concentration, dysphoric mood, self-injury, sleep disturbance and suicidal ideas. The patient is nervous/anxious. Objective:  /82   Pulse 99   Wt 163 lb (73.9 kg)   SpO2 98%   BMI 30.80 kg/m²   Physical Exam  Vitals and nursing note reviewed. Constitutional:       General: She is not in acute distress. Appearance: Normal appearance. She is well-developed. HENT:      Head: Normocephalic and atraumatic. Right Ear: Tympanic membrane and external ear normal.      Left Ear: Tympanic membrane and external ear normal.   Eyes:      General: No scleral icterus. Pupils: Pupils are equal, round, and reactive to light. Cardiovascular:      Rate and Rhythm: Normal rate and regular rhythm. Heart sounds: Normal heart sounds. No murmur heard. Pulmonary:      Effort: Pulmonary effort is normal. No respiratory distress. Breath sounds: Normal breath sounds. No wheezing. Abdominal:      Palpations: Abdomen is soft. Tenderness: There is no abdominal tenderness. Musculoskeletal:         General: Normal range of motion. Cervical back: Normal range of motion and neck supple. Lymphadenopathy:      Cervical: No cervical adenopathy. Skin:     General: Skin is warm and dry. Findings: No rash. Neurological:      Mental Status: She is alert and oriented to person, place, and time. Psychiatric:         Behavior: Behavior normal.         Thought Content: Thought content normal.         Judgment: Judgment normal.      Comments: Good eye contact, no suicidal ideations. Diagnostic Data:  Lab Results   Component Value Date    GLUCOSE 97 10/04/2022     Lab Results   Component Value Date    BUN 9 10/04/2022    CREATININE 0.67 10/04/2022     (L) 10/04/2022    K 3.8 10/04/2022    CALCIUM 10.6 (H) 10/04/2022    CL 94 (L) 10/04/2022    CO2 27 10/04/2022    LABGLOM >60 10/04/2022     Lab Results   Component Value Date    CHOL 246 (H) 03/29/2022    HDL 62 03/29/2022    LDLCHOLESTEROL 158 (H) 03/29/2022    LDLCALC 146 05/04/2017    TRIG 128 03/29/2022    ALT 15 03/29/2022    AST 18 03/29/2022     Lab Results   Component Value Date    TSH 1.74 10/04/2022    THYROXINE 1.18 01/19/2016     1. Anxiety and depression  Improved control  No suicidal ideations  Sleeping better  - buPROPion (WELLBUTRIN XL) 150 MG extended release tablet; Take 1 tablet by mouth every morning Anxiety and depression  Dispense: 30 tablet; Refill: 0    2. Essential hypertension  Stable and controlled    3. Obesity (BMI 30.0-34. 9)  Check with insurance options.    Limit portion sizes  Good mackenzie effort good water hydration        Health

## 2022-11-12 ASSESSMENT — ENCOUNTER SYMPTOMS
BACK PAIN: 0
CHEST TIGHTNESS: 0
EYES NEGATIVE: 1
COUGH: 0
RHINORRHEA: 0
SORE THROAT: 0
ABDOMINAL DISTENTION: 0

## 2022-11-28 ENCOUNTER — HOSPITAL ENCOUNTER (OUTPATIENT)
Dept: MAMMOGRAPHY | Age: 42
Discharge: HOME | End: 2022-11-30
Payer: COMMERCIAL

## 2022-11-28 DIAGNOSIS — Z00.00 ENCOUNTER FOR WELL ADULT EXAM WITHOUT ABNORMAL FINDINGS: ICD-10-CM

## 2022-11-28 DIAGNOSIS — Z12.31 BREAST CANCER SCREENING BY MAMMOGRAM: ICD-10-CM

## 2022-11-28 PROCEDURE — 77063 BREAST TOMOSYNTHESIS BI: CPT

## 2022-12-13 DIAGNOSIS — F32.A ANXIETY AND DEPRESSION: ICD-10-CM

## 2022-12-13 DIAGNOSIS — F41.9 ANXIETY AND DEPRESSION: ICD-10-CM

## 2022-12-13 RX ORDER — BUPROPION HYDROCHLORIDE 150 MG/1
150 TABLET ORAL EVERY MORNING
Qty: 90 TABLET | Refills: 0 | Status: SHIPPED | OUTPATIENT
Start: 2022-12-13

## 2023-03-10 DIAGNOSIS — F32.A ANXIETY AND DEPRESSION: ICD-10-CM

## 2023-03-10 DIAGNOSIS — F41.9 ANXIETY AND DEPRESSION: ICD-10-CM

## 2023-03-10 RX ORDER — BUPROPION HYDROCHLORIDE 150 MG/1
150 TABLET ORAL EVERY MORNING
Qty: 90 TABLET | Refills: 2 | Status: SHIPPED | OUTPATIENT
Start: 2023-03-10

## 2023-04-05 ENCOUNTER — OFFICE VISIT (OUTPATIENT)
Dept: PRIMARY CARE CLINIC | Age: 43
End: 2023-04-05
Payer: COMMERCIAL

## 2023-04-05 VITALS
BODY MASS INDEX: 28.89 KG/M2 | OXYGEN SATURATION: 96 % | DIASTOLIC BLOOD PRESSURE: 78 MMHG | SYSTOLIC BLOOD PRESSURE: 110 MMHG | WEIGHT: 153 LBS | HEART RATE: 88 BPM | HEIGHT: 61 IN

## 2023-04-05 DIAGNOSIS — Z13.220 SCREENING, LIPID: ICD-10-CM

## 2023-04-05 DIAGNOSIS — E66.3 OVERWEIGHT WITH BODY MASS INDEX (BMI) OF 28 TO 28.9 IN ADULT: ICD-10-CM

## 2023-04-05 DIAGNOSIS — R63.4 WEIGHT LOSS: ICD-10-CM

## 2023-04-05 DIAGNOSIS — F41.9 ANXIETY AND DEPRESSION: ICD-10-CM

## 2023-04-05 DIAGNOSIS — F32.A ANXIETY AND DEPRESSION: ICD-10-CM

## 2023-04-05 DIAGNOSIS — Z86.79 HX OF SUPRAVENTRICULAR TACHYCARDIA: ICD-10-CM

## 2023-04-05 DIAGNOSIS — I10 ESSENTIAL HYPERTENSION: Primary | ICD-10-CM

## 2023-04-05 PROCEDURE — 3074F SYST BP LT 130 MM HG: CPT | Performed by: NURSE PRACTITIONER

## 2023-04-05 PROCEDURE — 99214 OFFICE O/P EST MOD 30 MIN: CPT | Performed by: NURSE PRACTITIONER

## 2023-04-05 PROCEDURE — 3078F DIAST BP <80 MM HG: CPT | Performed by: NURSE PRACTITIONER

## 2023-04-05 RX ORDER — DILTIAZEM HYDROCHLORIDE 180 MG/1
CAPSULE, COATED, EXTENDED RELEASE ORAL
Qty: 90 CAPSULE | Refills: 3 | Status: SHIPPED | OUTPATIENT
Start: 2023-04-05

## 2023-04-05 RX ORDER — HYDROCHLOROTHIAZIDE 12.5 MG/1
12.5 CAPSULE, GELATIN COATED ORAL EVERY MORNING
Qty: 90 CAPSULE | Refills: 2 | Status: SHIPPED | OUTPATIENT
Start: 2023-04-05

## 2023-04-05 SDOH — ECONOMIC STABILITY: FOOD INSECURITY: WITHIN THE PAST 12 MONTHS, YOU WORRIED THAT YOUR FOOD WOULD RUN OUT BEFORE YOU GOT MONEY TO BUY MORE.: NEVER TRUE

## 2023-04-05 SDOH — ECONOMIC STABILITY: HOUSING INSECURITY
IN THE LAST 12 MONTHS, WAS THERE A TIME WHEN YOU DID NOT HAVE A STEADY PLACE TO SLEEP OR SLEPT IN A SHELTER (INCLUDING NOW)?: NO

## 2023-04-05 SDOH — ECONOMIC STABILITY: FOOD INSECURITY: WITHIN THE PAST 12 MONTHS, THE FOOD YOU BOUGHT JUST DIDN'T LAST AND YOU DIDN'T HAVE MONEY TO GET MORE.: NEVER TRUE

## 2023-04-05 SDOH — ECONOMIC STABILITY: INCOME INSECURITY: HOW HARD IS IT FOR YOU TO PAY FOR THE VERY BASICS LIKE FOOD, HOUSING, MEDICAL CARE, AND HEATING?: NOT HARD AT ALL

## 2023-04-05 ASSESSMENT — PATIENT HEALTH QUESTIONNAIRE - PHQ9
1. LITTLE INTEREST OR PLEASURE IN DOING THINGS: 0
2. FEELING DOWN, DEPRESSED OR HOPELESS: 0
7. TROUBLE CONCENTRATING ON THINGS, SUCH AS READING THE NEWSPAPER OR WATCHING TELEVISION: 0
9. THOUGHTS THAT YOU WOULD BE BETTER OFF DEAD, OR OF HURTING YOURSELF: 0
3. TROUBLE FALLING OR STAYING ASLEEP: 0
5. POOR APPETITE OR OVEREATING: 0
SUM OF ALL RESPONSES TO PHQ QUESTIONS 1-9: 0
SUM OF ALL RESPONSES TO PHQ QUESTIONS 1-9: 0
8. MOVING OR SPEAKING SO SLOWLY THAT OTHER PEOPLE COULD HAVE NOTICED. OR THE OPPOSITE, BEING SO FIGETY OR RESTLESS THAT YOU HAVE BEEN MOVING AROUND A LOT MORE THAN USUAL: 0
6. FEELING BAD ABOUT YOURSELF - OR THAT YOU ARE A FAILURE OR HAVE LET YOURSELF OR YOUR FAMILY DOWN: 0
SUM OF ALL RESPONSES TO PHQ QUESTIONS 1-9: 0
SUM OF ALL RESPONSES TO PHQ QUESTIONS 1-9: 0
4. FEELING TIRED OR HAVING LITTLE ENERGY: 0
10. IF YOU CHECKED OFF ANY PROBLEMS, HOW DIFFICULT HAVE THESE PROBLEMS MADE IT FOR YOU TO DO YOUR WORK, TAKE CARE OF THINGS AT HOME, OR GET ALONG WITH OTHER PEOPLE: 0
SUM OF ALL RESPONSES TO PHQ9 QUESTIONS 1 & 2: 0

## 2023-04-05 ASSESSMENT — ENCOUNTER SYMPTOMS
SHORTNESS OF BREATH: 0
EYES NEGATIVE: 1
RHINORRHEA: 0
SINUS PAIN: 0

## 2023-04-05 NOTE — PROGRESS NOTES
Data:  Lab Results   Component Value Date    GLUCOSE 97 10/04/2022     Lab Results   Component Value Date    BUN 9 10/04/2022    CREATININE 0.67 10/04/2022     (L) 10/04/2022    K 3.8 10/04/2022    CALCIUM 10.6 (H) 10/04/2022    CL 94 (L) 10/04/2022    CO2 27 10/04/2022    LABGLOM >60 10/04/2022     Lab Results   Component Value Date    CHOL 246 (H) 03/29/2022    HDL 62 03/29/2022    LDLCHOLESTEROL 158 (H) 03/29/2022    LDLCALC 146 05/04/2017    TRIG 128 03/29/2022    ALT 15 03/29/2022    AST 18 03/29/2022     Lab Results   Component Value Date    TSH 1.74 10/04/2022    THYROXINE 1.18 01/19/2016     1. Essential hypertension  Well controlled  Improved with weight loss  - Comprehensive Metabolic Panel; Future  - CBC with Auto Differential; Future  - dilTIAZem (CARDIZEM CD) 180 MG extended release capsule; TAKE 1 CAPSULE DAILY  Dispense: 90 capsule; Refill: 3    2. Anxiety and depression  Stable and controlled on bupropion     3. Weight loss  Intentional with cutting down on sugar foods     4. Overweight with body mass index (BMI) of 28 to 28.9 in adult    5. Hx of supraventricular tachycardia  No recurrence     6. Screening, lipid    - Lipid Panel; Future        Health Maintenance:  Discussed health maintenance issues:   Follows with GYN for pap/pelvic an mammo  Discussed immunization schedule: patient is up to date for vaccinations    Follow Up  Labs: fasting lipids, LFT's, and thyroid levels to be drawn before next appt physical.  Follow up appt to be scheduled in 6 months to f/u labs    Electronically signed by BRUNA Young CNP on 4/7/2023 at 10:56 PM

## 2023-07-18 ENCOUNTER — HOSPITAL ENCOUNTER (OUTPATIENT)
Age: 43
Discharge: HOME | End: 2023-07-18
Payer: COMMERCIAL

## 2023-07-18 DIAGNOSIS — I10 ESSENTIAL HYPERTENSION: ICD-10-CM

## 2023-07-18 DIAGNOSIS — Z13.220 SCREENING, LIPID: ICD-10-CM

## 2023-07-18 LAB
ALBUMIN SERPL-MCNC: 4.5 G/DL (ref 3.5–5.2)
ALP SERPL-CCNC: 75 U/L (ref 35–104)
ALT SERPL-CCNC: 18 U/L (ref 5–33)
ANION GAP SERPL CALCULATED.3IONS-SCNC: 13 MMOL/L (ref 9–17)
AST SERPL-CCNC: 19 U/L
BASOPHILS # BLD: 0 K/UL (ref 0–0.2)
BASOPHILS NFR BLD: 1 % (ref 0–2)
BILIRUB SERPL-MCNC: 0.6 MG/DL (ref 0.3–1.2)
BUN SERPL-MCNC: 12 MG/DL (ref 6–20)
BUN/CREAT SERPL: 17 (ref 9–20)
CALCIUM SERPL-MCNC: 9.6 MG/DL (ref 8.6–10.4)
CHLORIDE SERPL-SCNC: 99 MMOL/L (ref 98–107)
CHOLEST SERPL-MCNC: 238 MG/DL
CHOLESTEROL/HDL RATIO: 4.7
CO2 SERPL-SCNC: 25 MMOL/L (ref 20–31)
CREAT SERPL-MCNC: 0.7 MG/DL (ref 0.5–0.9)
DIFFERENTIAL TYPE: YES
EOSINOPHIL # BLD: 0.1 K/UL (ref 0–0.4)
EOSINOPHILS RELATIVE PERCENT: 2 % (ref 0–5)
ERYTHROCYTE [DISTWIDTH] IN BLOOD BY AUTOMATED COUNT: 12.5 % (ref 12.1–15.2)
GFR SERPL CREATININE-BSD FRML MDRD: >60 ML/MIN/1.73M2
GLUCOSE SERPL-MCNC: 109 MG/DL (ref 70–99)
HCT VFR BLD AUTO: 39.1 % (ref 36–46)
HDLC SERPL-MCNC: 51 MG/DL
HGB BLD-MCNC: 13.6 G/DL (ref 12–16)
LDLC SERPL CALC-MCNC: 156 MG/DL (ref 0–130)
LYMPHOCYTES # BLD: 28 % (ref 15–40)
LYMPHOCYTES NFR BLD: 1.7 K/UL (ref 1–4.8)
MCH RBC QN AUTO: 32 PG (ref 26–34)
MCHC RBC AUTO-ENTMCNC: 34.8 G/DL (ref 31–37)
MCV RBC AUTO: 92 FL (ref 80–100)
MONOCYTES NFR BLD: 0.4 K/UL (ref 0–1)
MONOCYTES NFR BLD: 7 % (ref 4–8)
NEUTROPHILS NFR BLD: 62 % (ref 47–75)
NEUTS SEG NFR BLD: 3.8 K/UL (ref 2.5–7)
PATIENT FASTING?: YES
PLATELET # BLD AUTO: 381 K/UL (ref 140–450)
POTASSIUM SERPL-SCNC: 3.7 MMOL/L (ref 3.7–5.3)
PROT SERPL-MCNC: 7.3 G/DL (ref 6.4–8.3)
RBC # BLD AUTO: 4.25 M/UL (ref 4–5.2)
SODIUM SERPL-SCNC: 137 MMOL/L (ref 135–144)
TRIGL SERPL-MCNC: 153 MG/DL
WBC OTHER # BLD: 6 K/UL (ref 3.5–11)

## 2023-07-18 PROCEDURE — 80053 COMPREHEN METABOLIC PANEL: CPT

## 2023-07-18 PROCEDURE — 85027 COMPLETE CBC AUTOMATED: CPT

## 2023-07-18 PROCEDURE — 36415 COLL VENOUS BLD VENIPUNCTURE: CPT

## 2023-07-18 PROCEDURE — 80061 LIPID PANEL: CPT

## 2023-07-31 ENCOUNTER — OFFICE VISIT (OUTPATIENT)
Dept: FAMILY MEDICINE CLINIC | Age: 43
End: 2023-07-31
Payer: COMMERCIAL

## 2023-07-31 ENCOUNTER — HOSPITAL ENCOUNTER (OUTPATIENT)
Age: 43
Discharge: HOME | End: 2023-07-31
Payer: COMMERCIAL

## 2023-07-31 VITALS
HEIGHT: 61 IN | HEART RATE: 108 BPM | SYSTOLIC BLOOD PRESSURE: 142 MMHG | TEMPERATURE: 98.2 F | BODY MASS INDEX: 30.4 KG/M2 | WEIGHT: 161 LBS | DIASTOLIC BLOOD PRESSURE: 82 MMHG

## 2023-07-31 DIAGNOSIS — Z86.79 HX OF ATRIAL TACHYCARDIA: ICD-10-CM

## 2023-07-31 DIAGNOSIS — F33.0 MAJOR DEPRESSIVE DISORDER, RECURRENT, MILD (HCC): ICD-10-CM

## 2023-07-31 DIAGNOSIS — R00.0 TACHYCARDIA: ICD-10-CM

## 2023-07-31 DIAGNOSIS — J40 BRONCHITIS: Primary | ICD-10-CM

## 2023-07-31 DIAGNOSIS — J40 BRONCHITIS: ICD-10-CM

## 2023-07-31 PROCEDURE — 99213 OFFICE O/P EST LOW 20 MIN: CPT | Performed by: NURSE PRACTITIONER

## 2023-07-31 PROCEDURE — 93005 ELECTROCARDIOGRAM TRACING: CPT

## 2023-07-31 PROCEDURE — 3074F SYST BP LT 130 MM HG: CPT | Performed by: NURSE PRACTITIONER

## 2023-07-31 PROCEDURE — 3078F DIAST BP <80 MM HG: CPT | Performed by: NURSE PRACTITIONER

## 2023-07-31 RX ORDER — PREDNISONE 20 MG/1
40 TABLET ORAL DAILY
Qty: 6 TABLET | Refills: 0 | Status: SHIPPED | OUTPATIENT
Start: 2023-07-31 | End: 2023-08-03

## 2023-07-31 RX ORDER — CEFDINIR 300 MG/1
300 CAPSULE ORAL 2 TIMES DAILY
Qty: 14 CAPSULE | Refills: 0 | Status: SHIPPED | OUTPATIENT
Start: 2023-07-31 | End: 2023-08-07

## 2023-07-31 ASSESSMENT — PATIENT HEALTH QUESTIONNAIRE - PHQ9
SUM OF ALL RESPONSES TO PHQ QUESTIONS 1-9: 1
9. THOUGHTS THAT YOU WOULD BE BETTER OFF DEAD, OR OF HURTING YOURSELF: 0
SUM OF ALL RESPONSES TO PHQ QUESTIONS 1-9: 1
SUM OF ALL RESPONSES TO PHQ QUESTIONS 1-9: 1
6. FEELING BAD ABOUT YOURSELF - OR THAT YOU ARE A FAILURE OR HAVE LET YOURSELF OR YOUR FAMILY DOWN: 0
SUM OF ALL RESPONSES TO PHQ9 QUESTIONS 1 & 2: 0
2. FEELING DOWN, DEPRESSED OR HOPELESS: 0
4. FEELING TIRED OR HAVING LITTLE ENERGY: 1
10. IF YOU CHECKED OFF ANY PROBLEMS, HOW DIFFICULT HAVE THESE PROBLEMS MADE IT FOR YOU TO DO YOUR WORK, TAKE CARE OF THINGS AT HOME, OR GET ALONG WITH OTHER PEOPLE: 0
SUM OF ALL RESPONSES TO PHQ QUESTIONS 1-9: 1
5. POOR APPETITE OR OVEREATING: 0
1. LITTLE INTEREST OR PLEASURE IN DOING THINGS: 0
7. TROUBLE CONCENTRATING ON THINGS, SUCH AS READING THE NEWSPAPER OR WATCHING TELEVISION: 0
3. TROUBLE FALLING OR STAYING ASLEEP: 0
8. MOVING OR SPEAKING SO SLOWLY THAT OTHER PEOPLE COULD HAVE NOTICED. OR THE OPPOSITE, BEING SO FIGETY OR RESTLESS THAT YOU HAVE BEEN MOVING AROUND A LOT MORE THAN USUAL: 0

## 2023-07-31 NOTE — PROGRESS NOTES
HPI Notes    Name: Kevin Baig  : 1980         Chief Complaint:     Chief Complaint   Patient presents with    Cough       History of Present Illness:        HPI    40 Yo female presents with c/o cough x 1 week. Recent cruise 2 weeks ago, daughter recently diagnosed with Pneumonia. Pt feels ill now too with cough congestion some SOB. Pt with hx atrial tachycardia on cardizem,  hr today 105-120 regular. Denies any decongestants or caffeine use. Agreeable to EKG today  Denies chest pain, no nausea or diaphoresis. 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 Reviewed all health maintenance requirements and ordered appropriate tests  Health Maintenance Due   Topic Date Due    HIV screen  Never done    Hepatitis C screen  Never done    Varicella vaccine (2 of 2 - 13+ 2-dose series) 2014    Diabetes screen  Never done    COVID-19 Vaccine (3 - Booster for Moderna series) 2021    Flu vaccine (1) Never done       Past Surgical History:     Past Surgical History:   Procedure Laterality Date     SECTION      Dr. Massey Mask Right 2013    SALPINGECTOMY Bilateral         Medications:       Prior to Admission medications    Medication Sig Start Date End Date Taking?  Authorizing Provider   dilTIAZem (CARDIZEM) 30 MG tablet Take 1 tablet by mouth daily as needed (racing heart > 120) 23  Yes BRUNA Holm CNP   predniSONE (DELTASONE) 20 MG tablet Take 2 tablets by mouth daily for 3 days In am with food For asthmatic bronchitis 7/31/23 8/3/23 Yes BRUNA Holm CNP   cefdinir (OMNICEF) 300 MG capsule Take 1 capsule by mouth 2 times daily for 7 days 23 Yes BRUNA Holm - CNP   dilTIAZem (CARDIZEM CD) 180 MG

## 2023-08-01 LAB
EKG ATRIAL RATE: 81 BPM
EKG P AXIS: 41 DEGREES
EKG P-R INTERVAL: 152 MS
EKG Q-T INTERVAL: 382 MS
EKG QRS DURATION: 78 MS
EKG QTC CALCULATION (BAZETT): 443 MS
EKG R AXIS: 46 DEGREES
EKG T AXIS: 15 DEGREES
EKG VENTRICULAR RATE: 81 BPM

## 2023-08-01 PROCEDURE — 93010 ELECTROCARDIOGRAM REPORT: CPT | Performed by: INTERNAL MEDICINE

## 2023-08-02 ASSESSMENT — ENCOUNTER SYMPTOMS
SORE THROAT: 0
COUGH: 1
SINUS PRESSURE: 1
ABDOMINAL DISTENTION: 0
DIARRHEA: 0
CHEST TIGHTNESS: 1
SHORTNESS OF BREATH: 1
EYES NEGATIVE: 1
WHEEZING: 0
NAUSEA: 0
RHINORRHEA: 1

## 2023-08-08 ENCOUNTER — HOSPITAL ENCOUNTER (OUTPATIENT)
Age: 43
Discharge: HOME | End: 2023-08-08
Payer: COMMERCIAL

## 2023-08-08 ENCOUNTER — HOSPITAL ENCOUNTER (OUTPATIENT)
Dept: GENERAL RADIOLOGY | Age: 43
Discharge: HOME | End: 2023-08-10
Payer: COMMERCIAL

## 2023-08-08 ENCOUNTER — HOSPITAL ENCOUNTER (OUTPATIENT)
Age: 43
Discharge: HOME | End: 2023-08-10
Payer: COMMERCIAL

## 2023-08-08 ENCOUNTER — OFFICE VISIT (OUTPATIENT)
Dept: PRIMARY CARE CLINIC | Age: 43
End: 2023-08-08
Payer: COMMERCIAL

## 2023-08-08 VITALS
OXYGEN SATURATION: 99 % | SYSTOLIC BLOOD PRESSURE: 138 MMHG | HEART RATE: 108 BPM | HEIGHT: 61 IN | DIASTOLIC BLOOD PRESSURE: 80 MMHG | TEMPERATURE: 98 F | WEIGHT: 159 LBS | BODY MASS INDEX: 30.02 KG/M2

## 2023-08-08 VITALS — RESPIRATION RATE: 18 BRPM | HEART RATE: 100 BPM | OXYGEN SATURATION: 99 %

## 2023-08-08 DIAGNOSIS — J18.9 COMMUNITY ACQUIRED PNEUMONIA, UNSPECIFIED LATERALITY: ICD-10-CM

## 2023-08-08 DIAGNOSIS — R06.02 SOB (SHORTNESS OF BREATH): ICD-10-CM

## 2023-08-08 DIAGNOSIS — R06.02 SOB (SHORTNESS OF BREATH): Primary | ICD-10-CM

## 2023-08-08 DIAGNOSIS — Z78.9 RECENT TRAVEL ON CRUISE SHIP: ICD-10-CM

## 2023-08-08 DIAGNOSIS — R00.0 TACHYCARDIA: ICD-10-CM

## 2023-08-08 LAB
ANION GAP SERPL CALCULATED.3IONS-SCNC: 14 MMOL/L (ref 9–17)
BASOPHILS # BLD: 0 K/UL (ref 0–0.2)
BASOPHILS NFR BLD: 1 % (ref 0–2)
BUN SERPL-MCNC: 12 MG/DL (ref 6–20)
BUN/CREAT SERPL: 17 (ref 9–20)
CALCIUM SERPL-MCNC: 9.9 MG/DL (ref 8.6–10.4)
CHLORIDE SERPL-SCNC: 99 MMOL/L (ref 98–107)
CO2 SERPL-SCNC: 24 MMOL/L (ref 20–31)
CREAT SERPL-MCNC: 0.7 MG/DL (ref 0.5–0.9)
D DIMER PPP FEU-MCNC: 0.31 UG/ML FEU (ref 0–0.59)
DIFFERENTIAL TYPE: YES
EOSINOPHIL # BLD: 0.2 K/UL (ref 0–0.4)
EOSINOPHILS RELATIVE PERCENT: 2 % (ref 0–5)
ERYTHROCYTE [DISTWIDTH] IN BLOOD BY AUTOMATED COUNT: 12.7 % (ref 12.1–15.2)
GFR SERPL CREATININE-BSD FRML MDRD: >60 ML/MIN/1.73M2
GLUCOSE SERPL-MCNC: 110 MG/DL (ref 70–99)
HCT VFR BLD AUTO: 41.1 % (ref 36–46)
HGB BLD-MCNC: 14 G/DL (ref 12–16)
LYMPHOCYTES NFR BLD: 1.9 K/UL (ref 1–4.8)
LYMPHOCYTES RELATIVE PERCENT: 24 % (ref 15–40)
MCH RBC QN AUTO: 31.6 PG (ref 26–34)
MCHC RBC AUTO-ENTMCNC: 34.1 G/DL (ref 31–37)
MCV RBC AUTO: 92.6 FL (ref 80–100)
MONOCYTES NFR BLD: 0.5 K/UL (ref 0–1)
MONOCYTES NFR BLD: 6 % (ref 4–8)
NEUTROPHILS NFR BLD: 67 % (ref 47–75)
NEUTS SEG NFR BLD: 5.3 K/UL (ref 2.5–7)
PLATELET # BLD AUTO: 371 K/UL (ref 140–450)
POTASSIUM SERPL-SCNC: 3.5 MMOL/L (ref 3.7–5.3)
RBC # BLD AUTO: 4.43 M/UL (ref 4–5.2)
SODIUM SERPL-SCNC: 137 MMOL/L (ref 135–144)
WBC OTHER # BLD: 8 K/UL (ref 3.5–11)

## 2023-08-08 PROCEDURE — 3074F SYST BP LT 130 MM HG: CPT | Performed by: NURSE PRACTITIONER

## 2023-08-08 PROCEDURE — 3078F DIAST BP <80 MM HG: CPT | Performed by: NURSE PRACTITIONER

## 2023-08-08 PROCEDURE — 94664 DEMO&/EVAL PT USE INHALER: CPT

## 2023-08-08 PROCEDURE — 6370000000 HC RX 637 (ALT 250 FOR IP): Performed by: NURSE PRACTITIONER

## 2023-08-08 PROCEDURE — 99214 OFFICE O/P EST MOD 30 MIN: CPT | Performed by: NURSE PRACTITIONER

## 2023-08-08 PROCEDURE — 85379 FIBRIN DEGRADATION QUANT: CPT

## 2023-08-08 PROCEDURE — 85025 COMPLETE CBC W/AUTO DIFF WBC: CPT

## 2023-08-08 PROCEDURE — 71046 X-RAY EXAM CHEST 2 VIEWS: CPT

## 2023-08-08 PROCEDURE — 36415 COLL VENOUS BLD VENIPUNCTURE: CPT

## 2023-08-08 PROCEDURE — 80048 BASIC METABOLIC PNL TOTAL CA: CPT

## 2023-08-08 RX ORDER — AZITHROMYCIN 250 MG/1
250 TABLET, FILM COATED ORAL SEE ADMIN INSTRUCTIONS
Qty: 6 TABLET | Refills: 0 | Status: SHIPPED | OUTPATIENT
Start: 2023-08-08 | End: 2023-08-13

## 2023-08-08 RX ORDER — IPRATROPIUM BROMIDE AND ALBUTEROL SULFATE 2.5; .5 MG/3ML; MG/3ML
1 SOLUTION RESPIRATORY (INHALATION) ONCE
Status: COMPLETED | OUTPATIENT
Start: 2023-08-08 | End: 2023-08-08

## 2023-08-08 RX ORDER — AMOXICILLIN 875 MG/1
875 TABLET, COATED ORAL 2 TIMES DAILY
Qty: 20 TABLET | Refills: 0 | Status: SHIPPED | OUTPATIENT
Start: 2023-08-08 | End: 2023-08-18

## 2023-08-08 RX ORDER — DEXAMETHASONE 6 MG/1
6 TABLET ORAL
Qty: 6 TABLET | Refills: 0 | Status: SHIPPED | OUTPATIENT
Start: 2023-08-08 | End: 2023-08-14

## 2023-08-08 RX ADMIN — IPRATROPIUM BROMIDE AND ALBUTEROL SULFATE 1 DOSE: .5; 3 SOLUTION RESPIRATORY (INHALATION) at 14:14

## 2023-08-08 ASSESSMENT — PULMONARY FUNCTION TESTS: PEFR_L/MIN: 240

## 2023-08-08 NOTE — PROGRESS NOTES
HPI Notes    Name: Selvin Wilson  : 1980         Chief Complaint:     Chief Complaint   Patient presents with    Other     Pt was in last week with bronchitis. States her chest has felt tight x 1 week, gotten worse after finishing meds       History of Present Illness:        HPI    41year old female with continued congestion, harsh jagging cough, no chest pain. Hx smoking 5-10 pack years with quitting 4 years ago. Pt working as RN at [employer]. Denies any racing heart. Productive cough brown -green. Feels like she can't \"take a deep breath\"    Denies fever, chills, fatigue    History includes c/o cough onset around 25 recent cruise prior to illness     daughter recently diagnosed with Pneumonia. Pt feels ill now too with cough congestion some SOB. Pt with hx atrial tachycardia on cardizem  EKG done after last visit normal   Omnicef taken x 7 days some improvement , also took prednisone 40 mg daily x 3 days. Pt had tested for covid -19 negative ,  prior to last OV and treatment started. 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 Reviewed all health maintenance requirements and ordered appropriate tests  Health Maintenance Due   Topic Date Due    HIV screen  Never done    Hepatitis C screen  Never done    Varicella vaccine (2 of 2 - 13+ 2-dose series) 2014    Diabetes screen  Never done    COVID-19 Vaccine (3 - Booster for Moderna series) 2021    Flu vaccine (1) Never done       Past Surgical History:     Past Surgical History:   Procedure Laterality Date     SECTION      Dr. Girish Fiore Right 2013    SALPINGECTOMY Bilateral 2016        Medications:       Prior to Admission medications    Medication Sig Start Date End Date Taking?  Authorizing

## 2023-08-09 DIAGNOSIS — J18.9 COMMUNITY ACQUIRED PNEUMONIA, UNSPECIFIED LATERALITY: Primary | ICD-10-CM

## 2023-08-09 RX ORDER — ALBUTEROL SULFATE 90 UG/1
2 AEROSOL, METERED RESPIRATORY (INHALATION) 4 TIMES DAILY PRN
Qty: 54 G | Refills: 1 | Status: SHIPPED | OUTPATIENT
Start: 2023-08-09 | End: 2023-10-11

## 2023-08-10 ASSESSMENT — ENCOUNTER SYMPTOMS
SHORTNESS OF BREATH: 1
WHEEZING: 1
EYES NEGATIVE: 1
SORE THROAT: 0
COUGH: 1
CHEST TIGHTNESS: 0

## 2023-09-01 ENCOUNTER — TELEPHONE (OUTPATIENT)
Dept: PRIMARY CARE CLINIC | Age: 43
End: 2023-09-01

## 2023-09-01 ENCOUNTER — HOSPITAL ENCOUNTER (EMERGENCY)
Age: 43
Discharge: HOME | End: 2023-09-01
Attending: EMERGENCY MEDICINE
Payer: COMMERCIAL

## 2023-09-01 ENCOUNTER — APPOINTMENT (OUTPATIENT)
Dept: GENERAL RADIOLOGY | Age: 43
End: 2023-09-01
Payer: COMMERCIAL

## 2023-09-01 VITALS
RESPIRATION RATE: 20 BRPM | SYSTOLIC BLOOD PRESSURE: 161 MMHG | DIASTOLIC BLOOD PRESSURE: 103 MMHG | WEIGHT: 152 LBS | OXYGEN SATURATION: 98 % | HEART RATE: 110 BPM | TEMPERATURE: 98.1 F | HEIGHT: 61 IN | BODY MASS INDEX: 28.7 KG/M2

## 2023-09-01 DIAGNOSIS — S93.402A SPRAIN OF LEFT ANKLE, UNSPECIFIED LIGAMENT, INITIAL ENCOUNTER: Primary | ICD-10-CM

## 2023-09-01 PROCEDURE — 6370000000 HC RX 637 (ALT 250 FOR IP): Performed by: EMERGENCY MEDICINE

## 2023-09-01 PROCEDURE — 99283 EMERGENCY DEPT VISIT LOW MDM: CPT

## 2023-09-01 PROCEDURE — 73610 X-RAY EXAM OF ANKLE: CPT

## 2023-09-01 RX ORDER — IBUPROFEN 200 MG
800 TABLET ORAL ONCE
Status: COMPLETED | OUTPATIENT
Start: 2023-09-01 | End: 2023-09-01

## 2023-09-01 RX ADMIN — IBUPROFEN 800 MG: 200 TABLET, FILM COATED ORAL at 12:03

## 2023-09-01 ASSESSMENT — PAIN DESCRIPTION - PAIN TYPE: TYPE: ACUTE PAIN

## 2023-09-01 ASSESSMENT — PAIN DESCRIPTION - ORIENTATION
ORIENTATION: LEFT
ORIENTATION: LEFT

## 2023-09-01 ASSESSMENT — PAIN SCALES - GENERAL
PAINLEVEL_OUTOF10: 6
PAINLEVEL_OUTOF10: 6

## 2023-09-01 ASSESSMENT — PAIN DESCRIPTION - LOCATION
LOCATION: ANKLE
LOCATION: ANKLE

## 2023-09-01 ASSESSMENT — PAIN - FUNCTIONAL ASSESSMENT: PAIN_FUNCTIONAL_ASSESSMENT: 0-10

## 2023-09-01 NOTE — TELEPHONE ENCOUNTER
[facility] ([facility]) ED Follow up Call    Reason for ED visit: Left ankle sprain      9/1/2023    Harsh Nassar , this is Katherin Feliz LPN from [facility] APRN-CNP office, just calling to see how you are doing after your recent ED visit. Did you receive discharge instructions? Yes  Do you understand the discharge instructions? Yes  Did the ED give you any new prescriptions? No  Were you able to fill your prescriptions? N/A      Do you have one of our red, yellow and green  Zone sheets that help you to determine when you should go to the ED? No      Do you need or want to make a follow up appt with your PCP? No    Do you have any further needs in the home, e.g. equipment?   No        Future Appointments   Date Time Provider [address] [place] [address]   10/11/2023  8:00 AM BRUNA De Leon CNP nw pc [facility]

## 2023-09-01 NOTE — ED PROVIDER NOTES
EMERGENCY DEPARTMENT ENCOUNTER      CHIEF COMPLAINT    Chief Complaint   Patient presents with    Ankle Pain     Left ankle pain after missing step and rolled her ankle       HPI    Lovely Benavides is a 41 y.o. female who presents to the emergency room for evaluation of left ankle pain after missing a step this morning and rolling her left ankle. She denies any other associated symptoms. 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         SURGICAL HISTORY    Past Surgical History:   Procedure Laterality Date     SECTION      Dr. Isaiah Espana Right 2013    SALPINGECTOMY Bilateral        CURRENT MEDICATIONS    Current Outpatient Rx   Medication Sig Dispense Refill    albuterol sulfate HFA (VENTOLIN HFA) 108 (90 Base) MCG/ACT inhaler Inhale 2 puffs into the lungs 4 times daily as needed for Wheezing 54 g 1    dilTIAZem (CARDIZEM) 30 MG tablet Take 1 tablet by mouth daily as needed (racing heart > 120) 20 tablet 0    dilTIAZem (CARDIZEM CD) 180 MG extended release capsule TAKE 1 CAPSULE DAILY 90 capsule 3    hydroCHLOROthiazide (MICROZIDE) 12.5 MG capsule Take 1 capsule by mouth every morning 90 capsule 2    buPROPion (WELLBUTRIN XL) 150 MG extended release tablet Take 1 tablet by mouth every morning Anxiety and depression 90 tablet 2       ALLERGIES    No Known Allergies    FAMILY HISTORY    Family History   Problem Relation Age of Onset    Hypertension Mother     Depression Mother     Other Mother 61        alpha 1 antitrypsin deficiency     Other Father         essential tremor maeve upper extremities    Other Brother         familial tremor    Heart Disease Brother         valve disorder    Hypertension Maternal Grandmother     Emphysema Maternal Grandfather         smoker    Other Paternal Grandmother tenderness. Cardiovascular:  Normal heart rate, Normal rhythm, No murmurs, No rubs, No gallops. GI:  Bowel sounds normal, Soft, No tenderness, No masses, No pulsatile masses. Musculoskeletal: Painful range of motion left ankle. There is swelling and tenderness noted to the lateral aspect of the left ankle. No tenderness to the left fifth metatarsal or left midfoot. There is no tenderness to palpation of the left knee. Full range of motion of the left knee is noted without discomfort. Integument:  Warm, Dry, No erythema, No rash. Neurologic:  Alert & oriented x 3, Normal motor function, Normal sensory function, No focal deficits noted. Medical Decision Making    Patient presents to the emergency room for evaluation of left ankle pain after injury that occurred this morning. Vital signs are stable. Physical exam demonstrates painful range of motion of the left ankle with swelling and tenderness noted over the left ankle. There is no tenderness noted to the left midfoot or left fifth metatarsal.  X-ray 3 view of the left ankle was performed and results independently reviewed by me. There is no acute fracture noted. Patient placed in Ace wrap and discharged home in stable condition. 1)  Number and Complexity of Problems    Problem List This Visit:      Differential Diagnosis: Ankle sprain     Diagnoses Considered but Do Not Suspect: Ankle fracture     Pertinent Comorbid Conditions: N/A     2)  Data Reviewed    My EKG interpretation:  N/A     Decision Rules/Scores utilized: N/A     Tests considered but not ordered and why: N/A     External Documents Reviewed: N/A     Labs, EKG and imaging were independently interpreted by myself as documented. Agree with documented formal imaging reads by radiology. Additional records reviewed including [facility] EMR.      3)  Treatment and Disposition     Patient repeat assessment:  N/A    Rx medications considered: None     Disposition discussion with

## 2023-09-15 ENCOUNTER — TELEPHONE (OUTPATIENT)
Dept: PRIMARY CARE CLINIC | Age: 43
End: 2023-09-15

## 2023-09-15 NOTE — TELEPHONE ENCOUNTER
Inform patient ankle sprains can take some time to heal but since it is still aggravating and swelling a lot. Options are:    Ankle brace : ASO (ankle stabilizing orthotic) can  one at [facility] and wear during daytime only for additional support. Physical therapy to help allow healing and get  your mobility back    Back to office for reassessment. Xray was neg for fracture . Refer forward to ortho of choice for additional evaluation. Let me know her thoughts.      [facility]

## 2023-09-15 NOTE — TELEPHONE ENCOUNTER
Derian Oswald with options and recommendations. She voiced understanding. She will probably try the brace and see how she is doing in a week, at that time she will call if she is still having issues.

## 2023-10-11 ENCOUNTER — OFFICE VISIT (OUTPATIENT)
Dept: PRIMARY CARE CLINIC | Age: 43
End: 2023-10-11
Payer: COMMERCIAL

## 2023-10-11 VITALS
OXYGEN SATURATION: 99 % | BODY MASS INDEX: 29.83 KG/M2 | HEIGHT: 61 IN | DIASTOLIC BLOOD PRESSURE: 84 MMHG | WEIGHT: 158 LBS | HEART RATE: 90 BPM | SYSTOLIC BLOOD PRESSURE: 118 MMHG

## 2023-10-11 DIAGNOSIS — Z86.79 HX OF SUPRAVENTRICULAR TACHYCARDIA: ICD-10-CM

## 2023-10-11 DIAGNOSIS — Z00.00 ENCOUNTER FOR WELL ADULT EXAM WITHOUT ABNORMAL FINDINGS: Primary | ICD-10-CM

## 2023-10-11 DIAGNOSIS — F41.9 ANXIETY AND DEPRESSION: ICD-10-CM

## 2023-10-11 DIAGNOSIS — E66.3 OVERWEIGHT WITH BODY MASS INDEX (BMI) OF 28 TO 28.9 IN ADULT: ICD-10-CM

## 2023-10-11 DIAGNOSIS — Z12.31 SCREENING MAMMOGRAM, ENCOUNTER FOR: ICD-10-CM

## 2023-10-11 DIAGNOSIS — I10 ESSENTIAL HYPERTENSION: ICD-10-CM

## 2023-10-11 DIAGNOSIS — F32.A ANXIETY AND DEPRESSION: ICD-10-CM

## 2023-10-11 PROCEDURE — 99396 PREV VISIT EST AGE 40-64: CPT | Performed by: NURSE PRACTITIONER

## 2023-10-11 PROCEDURE — 3074F SYST BP LT 130 MM HG: CPT | Performed by: NURSE PRACTITIONER

## 2023-10-11 PROCEDURE — 3078F DIAST BP <80 MM HG: CPT | Performed by: NURSE PRACTITIONER

## 2023-10-11 RX ORDER — BUPROPION HYDROCHLORIDE 150 MG/1
150 TABLET ORAL EVERY MORNING
Qty: 90 TABLET | Refills: 2 | Status: SHIPPED | OUTPATIENT
Start: 2023-10-11

## 2023-10-11 RX ORDER — HYDROCHLOROTHIAZIDE 12.5 MG/1
12.5 CAPSULE, GELATIN COATED ORAL EVERY MORNING
Qty: 90 CAPSULE | Refills: 2 | Status: SHIPPED | OUTPATIENT
Start: 2023-10-11

## 2023-10-11 RX ORDER — DILTIAZEM HYDROCHLORIDE 180 MG/1
CAPSULE, COATED, EXTENDED RELEASE ORAL
Qty: 90 CAPSULE | Refills: 3 | Status: SHIPPED | OUTPATIENT
Start: 2023-10-11

## 2023-10-11 ASSESSMENT — ENCOUNTER SYMPTOMS
EYE DISCHARGE: 0
COUGH: 0

## 2023-10-11 NOTE — PROGRESS NOTES
Well Adult Note  Name: Brianne Greene Date: 10/12/2023   MRN: [id number] Sex: Female   Age: 41 y.o. Ethnicity: Non- / Non    : 1980 Race: White (non-)      Ariadne Cardoza is here for well adult exam.  History:    41year old female   Annual physical    [facility] Pap and mammogram, hx tubal ligation. No abnormal pap ever. No birth control. SBE done. Hypertension hx SVT -cardizem well controlled       Wellbutrin  mg daily working well for anxiety and depression. Also stopped smoking. 2 positive covid 19 illnesses, 1st asymptomatic, 2nd time moderate cold s/s with fatigue. Original covid 19 vaccines taken no booster. Gets influenza vaccine at work. Left ankle injury. Review of Systems   Constitutional:  Negative for activity change, chills and fever. HENT:  Negative for congestion. Eyes:  Negative for discharge. Respiratory:  Negative for cough. Endocrine: Negative for cold intolerance and heat intolerance. Genitourinary:  Negative for difficulty urinating. Neurological:  Negative for dizziness. No Known Allergies      Prior to Visit Medications    Medication Sig Taking? Authorizing Provider   diclofenac sodium (VOLTAREN) 1 % GEL Apply 2 g topically 2 times daily as needed for Pain Yes BRUNA Jensen CNP   dilTIAZem (CARDIZEM CD) 180 MG extended release capsule TAKE 1 CAPSULE DAILY Yes Lynette Hatfield APRN - CNP   hydroCHLOROthiazide (MICROZIDE) 12.5 MG capsule Take 1 capsule by mouth every morning Yes BRUNA Jensen CNP   buPROPion (WELLBUTRIN XL) 150 MG extended release tablet Take 1 tablet by mouth every morning Anxiety and depression Yes BRUNA Jensen CNP         Past Medical History:   Diagnosis Date    Benign essential HTN     Chicken pox     as child    VKIPB-29 2020    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         Past Surgical History:

## 2023-12-29 ENCOUNTER — OFFICE VISIT (OUTPATIENT)
Dept: PRIMARY CARE CLINIC | Age: 43
End: 2023-12-29
Payer: COMMERCIAL

## 2023-12-29 VITALS
DIASTOLIC BLOOD PRESSURE: 80 MMHG | SYSTOLIC BLOOD PRESSURE: 138 MMHG | TEMPERATURE: 98.2 F | BODY MASS INDEX: 30.21 KG/M2 | OXYGEN SATURATION: 100 % | WEIGHT: 160 LBS | HEART RATE: 107 BPM | HEIGHT: 61 IN

## 2023-12-29 DIAGNOSIS — R68.83 CHILLS: ICD-10-CM

## 2023-12-29 DIAGNOSIS — J01.00 ACUTE NON-RECURRENT MAXILLARY SINUSITIS: Primary | ICD-10-CM

## 2023-12-29 DIAGNOSIS — R53.83 OTHER FATIGUE: ICD-10-CM

## 2023-12-29 DIAGNOSIS — J02.9 SORE THROAT: ICD-10-CM

## 2023-12-29 LAB — S PYO AG THROAT QL: NORMAL

## 2023-12-29 PROCEDURE — 3079F DIAST BP 80-89 MM HG: CPT | Performed by: NURSE PRACTITIONER

## 2023-12-29 PROCEDURE — 99213 OFFICE O/P EST LOW 20 MIN: CPT | Performed by: NURSE PRACTITIONER

## 2023-12-29 PROCEDURE — 3075F SYST BP GE 130 - 139MM HG: CPT | Performed by: NURSE PRACTITIONER

## 2023-12-29 PROCEDURE — 87880 STREP A ASSAY W/OPTIC: CPT | Performed by: NURSE PRACTITIONER

## 2023-12-29 RX ORDER — AMOXICILLIN AND CLAVULANATE POTASSIUM 875; 125 MG/1; MG/1
1 TABLET, FILM COATED ORAL 2 TIMES DAILY
Qty: 20 TABLET | Refills: 0 | Status: SHIPPED | OUTPATIENT
Start: 2023-12-29 | End: 2024-01-08

## 2023-12-29 NOTE — PATIENT INSTRUCTIONS
Phone: [phone]  Fax: [facility] Office Hours:  Monday: [place] office location 8-5 ([phone]) Offering additional late hours the first Monday of the month until 7 pm.   Tuesday: 8-5 Wednesday: 8-5 Thursday:  Additional hours offered 2 Thursdays a month. Please call to inquire those dates. Fridays: 7:30-4:30   SURVEY:    You may be receiving a survey from [employer] regarding your visit today. Please complete the survey to enable us to provide the highest quality of care to you and your family. If you cannot score us a very good on any question, please call the office to discuss how we could have made your experience a very good one. Thank you.

## 2023-12-29 NOTE — PROGRESS NOTES
by mouth 2 times daily for 10 days For sinusitis     No follow-ups on file. Orders Placed This Encounter   Medications    amoxicillin-clavulanate (AUGMENTIN) 875-125 MG per tablet     Sig: Take 1 tablet by mouth 2 times daily for 10 days For sinusitis     Dispense:  20 tablet     Refill:  0     Orders Placed This Encounter   Procedures    POCT rapid strep A         Patient Instructions   Phone: [phone]  Fax: [facility] Office Hours:  Monday: [place] office location 8-5 ([phone]) Offering additional late hours the first Monday of the month until 7 pm.   Tuesday: 8-5 Wednesday: 8-5 Thursday:  Additional hours offered 2 Thursdays a month. Please call to inquire those dates. Fridays: 7:30-4:30   SURVEY:    You may be receiving a survey from [employer] regarding your visit today. Please complete the survey to enable us to provide the highest quality of care to you and your family. If you cannot score us a very good on any question, please call the office to discuss how we could have made your experience a very good one. Thank you.      Electronically signed by BRUNA Flanagan CNP on 12/30/2023 at 3:37 PM           Completed Refills   Requested Prescriptions     Signed Prescriptions Disp Refills    amoxicillin-clavulanate (AUGMENTIN) 875-125 MG per tablet 20 tablet 0     Sig: Take 1 tablet by mouth 2 times daily for 10 days For sinusitis

## 2024-01-24 ENCOUNTER — HOSPITAL ENCOUNTER (OUTPATIENT)
Dept: MAMMOGRAPHY | Age: 44
Discharge: HOME | End: 2024-01-26
Payer: COMMERCIAL

## 2024-01-24 DIAGNOSIS — Z12.31 SCREENING MAMMOGRAM, ENCOUNTER FOR: ICD-10-CM

## 2024-01-24 PROCEDURE — 77063 BREAST TOMOSYNTHESIS BI: CPT

## 2024-01-29 DIAGNOSIS — Z12.31 SCREENING MAMMOGRAM, ENCOUNTER FOR: Primary | ICD-10-CM

## 2024-04-09 ENCOUNTER — OFFICE VISIT (OUTPATIENT)
Dept: PRIMARY CARE CLINIC | Age: 44
End: 2024-04-09
Payer: COMMERCIAL

## 2024-04-09 VITALS
HEART RATE: 87 BPM | SYSTOLIC BLOOD PRESSURE: 126 MMHG | HEIGHT: 61 IN | OXYGEN SATURATION: 100 % | DIASTOLIC BLOOD PRESSURE: 80 MMHG | WEIGHT: 167 LBS | BODY MASS INDEX: 31.53 KG/M2

## 2024-04-09 DIAGNOSIS — F32.A ANXIETY AND DEPRESSION: ICD-10-CM

## 2024-04-09 DIAGNOSIS — F33.0 MAJOR DEPRESSIVE DISORDER, RECURRENT, MILD (HCC): ICD-10-CM

## 2024-04-09 DIAGNOSIS — I10 ESSENTIAL HYPERTENSION: Primary | ICD-10-CM

## 2024-04-09 DIAGNOSIS — F41.9 ANXIETY AND DEPRESSION: ICD-10-CM

## 2024-04-09 DIAGNOSIS — E66.09 CLASS 1 OBESITY DUE TO EXCESS CALORIES WITHOUT SERIOUS COMORBIDITY WITH BODY MASS INDEX (BMI) OF 31.0 TO 31.9 IN ADULT: ICD-10-CM

## 2024-04-09 PROCEDURE — 3074F SYST BP LT 130 MM HG: CPT | Performed by: NURSE PRACTITIONER

## 2024-04-09 PROCEDURE — 99213 OFFICE O/P EST LOW 20 MIN: CPT | Performed by: NURSE PRACTITIONER

## 2024-04-09 PROCEDURE — 3079F DIAST BP 80-89 MM HG: CPT | Performed by: NURSE PRACTITIONER

## 2024-04-09 RX ORDER — BUPROPION HYDROCHLORIDE 150 MG/1
150 TABLET ORAL EVERY MORNING
Qty: 90 TABLET | Refills: 2 | Status: SHIPPED | OUTPATIENT
Start: 2024-04-09

## 2024-04-09 RX ORDER — HYDROCHLOROTHIAZIDE 12.5 MG/1
12.5 CAPSULE, GELATIN COATED ORAL EVERY MORNING
Qty: 90 CAPSULE | Refills: 2 | Status: SHIPPED | OUTPATIENT
Start: 2024-04-09

## 2024-04-09 SDOH — ECONOMIC STABILITY: FOOD INSECURITY: WITHIN THE PAST 12 MONTHS, THE FOOD YOU BOUGHT JUST DIDN'T LAST AND YOU DIDN'T HAVE MONEY TO GET MORE.: NEVER TRUE

## 2024-04-09 SDOH — ECONOMIC STABILITY: FOOD INSECURITY: WITHIN THE PAST 12 MONTHS, YOU WORRIED THAT YOUR FOOD WOULD RUN OUT BEFORE YOU GOT MONEY TO BUY MORE.: NEVER TRUE

## 2024-04-09 SDOH — ECONOMIC STABILITY: INCOME INSECURITY: HOW HARD IS IT FOR YOU TO PAY FOR THE VERY BASICS LIKE FOOD, HOUSING, MEDICAL CARE, AND HEATING?: NOT HARD AT ALL

## 2024-04-09 ASSESSMENT — PATIENT HEALTH QUESTIONNAIRE - PHQ9
10. IF YOU CHECKED OFF ANY PROBLEMS, HOW DIFFICULT HAVE THESE PROBLEMS MADE IT FOR YOU TO DO YOUR WORK, TAKE CARE OF THINGS AT HOME, OR GET ALONG WITH OTHER PEOPLE: NOT DIFFICULT AT ALL
SUM OF ALL RESPONSES TO PHQ QUESTIONS 1-9: 0
7. TROUBLE CONCENTRATING ON THINGS, SUCH AS READING THE NEWSPAPER OR WATCHING TELEVISION: NOT AT ALL
9. THOUGHTS THAT YOU WOULD BE BETTER OFF DEAD, OR OF HURTING YOURSELF: NOT AT ALL
1. LITTLE INTEREST OR PLEASURE IN DOING THINGS: NOT AT ALL
SUM OF ALL RESPONSES TO PHQ QUESTIONS 1-9: 0
3. TROUBLE FALLING OR STAYING ASLEEP: NOT AT ALL
4. FEELING TIRED OR HAVING LITTLE ENERGY: NOT AT ALL
6. FEELING BAD ABOUT YOURSELF - OR THAT YOU ARE A FAILURE OR HAVE LET YOURSELF OR YOUR FAMILY DOWN: NOT AT ALL
8. MOVING OR SPEAKING SO SLOWLY THAT OTHER PEOPLE COULD HAVE NOTICED. OR THE OPPOSITE, BEING SO FIGETY OR RESTLESS THAT YOU HAVE BEEN MOVING AROUND A LOT MORE THAN USUAL: NOT AT ALL
SUM OF ALL RESPONSES TO PHQ9 QUESTIONS 1 & 2: 0
5. POOR APPETITE OR OVEREATING: NOT AT ALL
2. FEELING DOWN, DEPRESSED OR HOPELESS: NOT AT ALL

## 2024-04-09 ASSESSMENT — ENCOUNTER SYMPTOMS
ABDOMINAL DISTENTION: 0
SHORTNESS OF BREATH: 0
CONSTIPATION: 0
CHEST TIGHTNESS: 0
SORE THROAT: 0
WHEEZING: 0
EYES NEGATIVE: 1
COUGH: 0

## 2024-04-09 NOTE — PROGRESS NOTES
[facility]ARE [place]  [address] [address]  Dept: [phone]     Subjective:  Doris Briones is a 44 y.o. female presenting today for routine follow up of hypertension and hyperlipidemia and recent labs.  She denies chest pain, shortness of breath or palpitations.  She denies headaches, vision changes and lightheadedness. She denies myalgias.  She denies numbness and tingling in the hands and feet. She has been compliant with diet and exercise. She has been compliant with her medications.  She is tolerating medications.    Bifocal glasses this year for first year- Dr. Reilly     Hypertension: well controlled with current medications   Medications: continue current medication doses   Cardizem, hctz   Don't add salt   Exercise walking few times a week. , golf     Impaired fasting glucose:  stable with normal A1c  Recommend low carb diet    Nutrition Status:  well developed, well nourished   Recommend continue current healthy lifestyle with diet and regular exercise         CURRENT ALLERGIES: Patient has no known allergies.    SOCIAL HISTORY:   Social History     Tobacco Use    Smoking status: Former     Current packs/day: 0.00     Average packs/day: 0.5 packs/day for 10.0 years (5.0 ttl pk-yrs)     Types: Cigarettes     Start date: 2009     Quit date: 2019     Years since quittin.2    Smokeless tobacco: Never   Substance Use Topics    Alcohol use: Yes     Comment: rarely    Drug use: No       Hypertension  Pertinent negatives include no chest pain, headaches, palpitations or shortness of breath.          Medication List            Accurate as of 2024 11:59 PM. If you have any questions, ask your nurse or doctor.                CONTINUE taking these medications      buPROPion 150 MG extended release tablet  Commonly known as: Wellbutrin XL  Take 1 tablet by mouth every morning Anxiety and depression

## 2024-04-14 PROBLEM — E66.811 CLASS 1 OBESITY DUE TO EXCESS CALORIES WITHOUT SERIOUS COMORBIDITY WITH BODY MASS INDEX (BMI) OF 31.0 TO 31.9 IN ADULT: Status: ACTIVE | Noted: 2024-04-14

## 2024-04-14 PROBLEM — E66.09 CLASS 1 OBESITY DUE TO EXCESS CALORIES WITHOUT SERIOUS COMORBIDITY WITH BODY MASS INDEX (BMI) OF 31.0 TO 31.9 IN ADULT: Status: ACTIVE | Noted: 2024-04-14

## 2024-05-02 ENCOUNTER — HOSPITAL ENCOUNTER (OUTPATIENT)
Age: 44
Discharge: HOME | End: 2024-05-02
Payer: COMMERCIAL

## 2024-05-02 ENCOUNTER — PATIENT MESSAGE (OUTPATIENT)
Dept: PRIMARY CARE CLINIC | Age: 44
End: 2024-05-02

## 2024-05-02 DIAGNOSIS — I10 ESSENTIAL HYPERTENSION: ICD-10-CM

## 2024-05-02 LAB
ALBUMIN SERPL-MCNC: 4.4 G/DL (ref 3.5–5.2)
ALP SERPL-CCNC: 70 U/L (ref 35–104)
ALT SERPL-CCNC: 10 U/L (ref 5–33)
ANION GAP SERPL CALCULATED.3IONS-SCNC: 11 MMOL/L (ref 9–17)
AST SERPL-CCNC: 16 U/L
BILIRUB SERPL-MCNC: 0.4 MG/DL (ref 0.3–1.2)
BUN SERPL-MCNC: 9 MG/DL (ref 6–20)
BUN/CREAT SERPL: 11 (ref 9–20)
CALCIUM SERPL-MCNC: 9.4 MG/DL (ref 8.6–10.4)
CHLORIDE SERPL-SCNC: 100 MMOL/L (ref 98–107)
CO2 SERPL-SCNC: 26 MMOL/L (ref 20–31)
CREAT SERPL-MCNC: 0.8 MG/DL (ref 0.5–0.9)
GFR, ESTIMATED: >90 ML/MIN/1.73M2
GLUCOSE SERPL-MCNC: 103 MG/DL (ref 70–99)
MAGNESIUM SERPL-MCNC: 2.4 MG/DL (ref 1.6–2.6)
POTASSIUM SERPL-SCNC: 3.9 MMOL/L (ref 3.7–5.3)
PROT SERPL-MCNC: 7 G/DL (ref 6.4–8.3)
SODIUM SERPL-SCNC: 137 MMOL/L (ref 135–144)

## 2024-05-02 PROCEDURE — 83735 ASSAY OF MAGNESIUM: CPT

## 2024-05-02 PROCEDURE — 80053 COMPREHEN METABOLIC PANEL: CPT

## 2024-05-02 PROCEDURE — 36415 COLL VENOUS BLD VENIPUNCTURE: CPT

## 2024-05-07 RX ORDER — BUPROPION HYDROCHLORIDE 75 MG/1
75 TABLET ORAL 2 TIMES DAILY
Qty: 60 TABLET | Refills: 0 | Status: SHIPPED | OUTPATIENT
Start: 2024-05-07

## 2024-07-08 RX ORDER — BUPROPION HYDROCHLORIDE 75 MG/1
75 TABLET ORAL 2 TIMES DAILY
Qty: 60 TABLET | Refills: 0 | Status: CANCELLED | OUTPATIENT
Start: 2024-07-08

## 2024-07-10 RX ORDER — BUPROPION HYDROCHLORIDE 75 MG/1
75 TABLET ORAL DAILY
Qty: 90 TABLET | Refills: 1 | Status: SHIPPED | OUTPATIENT
Start: 2024-07-10

## 2024-07-10 NOTE — TELEPHONE ENCOUNTER
Clarify med dose pt was weaning down she was on bupropion (wellbutrin) 75 mg 2 x day?      Will send refill once clarified.     thanks

## 2024-09-17 DIAGNOSIS — I10 ESSENTIAL HYPERTENSION: ICD-10-CM

## 2024-09-17 RX ORDER — DILTIAZEM HYDROCHLORIDE 180 MG/1
CAPSULE, COATED, EXTENDED RELEASE ORAL
Qty: 90 CAPSULE | Refills: 3 | Status: SHIPPED | OUTPATIENT
Start: 2024-09-17

## 2024-09-19 ENCOUNTER — OFFICE VISIT (OUTPATIENT)
Dept: PRIMARY CARE CLINIC | Age: 44
End: 2024-09-19
Payer: COMMERCIAL

## 2024-09-19 ENCOUNTER — HOSPITAL ENCOUNTER (OUTPATIENT)
Age: 44
Setting detail: SPECIMEN
Discharge: HOME | End: 2024-09-19
Payer: COMMERCIAL

## 2024-09-19 VITALS
DIASTOLIC BLOOD PRESSURE: 82 MMHG | SYSTOLIC BLOOD PRESSURE: 124 MMHG | HEART RATE: 103 BPM | BODY MASS INDEX: 29.27 KG/M2 | HEIGHT: 61 IN | OXYGEN SATURATION: 99 % | WEIGHT: 155 LBS

## 2024-09-19 DIAGNOSIS — K62.5 RECTAL BLEEDING: ICD-10-CM

## 2024-09-19 DIAGNOSIS — K62.5 RECTAL BLEEDING: Primary | ICD-10-CM

## 2024-09-19 DIAGNOSIS — I10 ESSENTIAL HYPERTENSION: ICD-10-CM

## 2024-09-19 PROCEDURE — 85025 COMPLETE CBC W/AUTO DIFF WBC: CPT

## 2024-09-19 PROCEDURE — 99213 OFFICE O/P EST LOW 20 MIN: CPT | Performed by: NURSE PRACTITIONER

## 2024-09-19 PROCEDURE — 3074F SYST BP LT 130 MM HG: CPT | Performed by: NURSE PRACTITIONER

## 2024-09-19 PROCEDURE — 3079F DIAST BP 80-89 MM HG: CPT | Performed by: NURSE PRACTITIONER

## 2024-09-19 PROCEDURE — 80053 COMPREHEN METABOLIC PANEL: CPT

## 2024-09-19 RX ORDER — HYDROCORTISONE ACETATE 25 MG/1
25 SUPPOSITORY RECTAL 2 TIMES DAILY PRN
Qty: 12 SUPPOSITORY | Refills: 1 | Status: SHIPPED | OUTPATIENT
Start: 2024-09-19 | End: 2024-09-22

## 2024-09-19 ASSESSMENT — ENCOUNTER SYMPTOMS: HEMATOCHEZIA: 1

## 2024-09-20 LAB
ALBUMIN SERPL-MCNC: 4.6 G/DL (ref 3.5–5.2)
ALBUMIN/GLOB SERPL: 1.6 {RATIO} (ref 1–2.5)
ALP SERPL-CCNC: 69 U/L (ref 35–104)
ALT SERPL-CCNC: 12 U/L (ref 10–35)
ANION GAP SERPL CALCULATED.3IONS-SCNC: 13 MMOL/L (ref 9–16)
AST SERPL-CCNC: 21 U/L (ref 10–35)
BASOPHILS # BLD: 0.04 K/UL (ref 0–0.2)
BASOPHILS NFR BLD: 0 % (ref 0–2)
BILIRUB SERPL-MCNC: 0.2 MG/DL (ref 0–1.2)
BUN SERPL-MCNC: 7 MG/DL (ref 6–20)
BUN/CREAT SERPL: 9 (ref 9–20)
CALCIUM SERPL-MCNC: 10.1 MG/DL (ref 8.6–10.4)
CHLORIDE SERPL-SCNC: 97 MMOL/L (ref 98–107)
CO2 SERPL-SCNC: 28 MMOL/L (ref 20–31)
CREAT SERPL-MCNC: 0.8 MG/DL (ref 0.5–0.9)
EOSINOPHIL # BLD: 0.26 K/UL (ref 0–0.44)
EOSINOPHILS RELATIVE PERCENT: 3 % (ref 1–4)
ERYTHROCYTE [DISTWIDTH] IN BLOOD BY AUTOMATED COUNT: 11.9 % (ref 11.8–14.4)
GFR, ESTIMATED: >90 ML/MIN/1.73M2
GLUCOSE SERPL-MCNC: 75 MG/DL (ref 74–99)
HCT VFR BLD AUTO: 43.7 % (ref 36.3–47.1)
HGB BLD-MCNC: 14.6 G/DL (ref 11.9–15.1)
IMM GRANULOCYTES # BLD AUTO: <0.03 K/UL (ref 0–0.3)
IMM GRANULOCYTES NFR BLD: 0 %
LYMPHOCYTES NFR BLD: 2.5 K/UL (ref 1.1–3.7)
LYMPHOCYTES RELATIVE PERCENT: 25 % (ref 24–43)
MCH RBC QN AUTO: 31.4 PG (ref 25.2–33.5)
MCHC RBC AUTO-ENTMCNC: 33.4 G/DL (ref 28.4–34.8)
MCV RBC AUTO: 94 FL (ref 82.6–102.9)
MONOCYTES NFR BLD: 0.56 K/UL (ref 0.1–1.2)
MONOCYTES NFR BLD: 6 % (ref 3–12)
NEUTROPHILS NFR BLD: 66 % (ref 36–65)
NEUTS SEG NFR BLD: 6.46 K/UL (ref 1.5–8.1)
NRBC BLD-RTO: 0 PER 100 WBC
PLATELET # BLD AUTO: 401 K/UL (ref 138–453)
PMV BLD AUTO: 9.5 FL (ref 8.1–13.5)
POTASSIUM SERPL-SCNC: 3.2 MMOL/L (ref 3.7–5.3)
PROT SERPL-MCNC: 7.4 G/DL (ref 6.6–8.7)
RBC # BLD AUTO: 4.65 M/UL (ref 3.95–5.11)
SODIUM SERPL-SCNC: 138 MMOL/L (ref 136–145)
WBC OTHER # BLD: 9.8 K/UL (ref 3.5–11.3)

## 2024-09-21 ASSESSMENT — ENCOUNTER SYMPTOMS
SHORTNESS OF BREATH: 0
CHEST TIGHTNESS: 0
EYES NEGATIVE: 1
COUGH: 0
WHEEZING: 0
SORE THROAT: 0

## 2024-09-25 ENCOUNTER — OFFICE VISIT (OUTPATIENT)
Dept: GASTROENTEROLOGY | Age: 44
End: 2024-09-25
Payer: COMMERCIAL

## 2024-09-25 ENCOUNTER — TELEPHONE (OUTPATIENT)
Dept: GASTROENTEROLOGY | Age: 44
End: 2024-09-25

## 2024-09-25 VITALS
SYSTOLIC BLOOD PRESSURE: 123 MMHG | RESPIRATION RATE: 18 BRPM | BODY MASS INDEX: 27.96 KG/M2 | DIASTOLIC BLOOD PRESSURE: 86 MMHG | WEIGHT: 148 LBS | OXYGEN SATURATION: 99 % | HEART RATE: 98 BPM

## 2024-09-25 DIAGNOSIS — K62.5 RECTAL BLEEDING: Primary | ICD-10-CM

## 2024-09-25 DIAGNOSIS — Z01.818 PRE-OP TESTING: ICD-10-CM

## 2024-09-25 PROCEDURE — 3079F DIAST BP 80-89 MM HG: CPT | Performed by: NURSE PRACTITIONER

## 2024-09-25 PROCEDURE — 99203 OFFICE O/P NEW LOW 30 MIN: CPT | Performed by: NURSE PRACTITIONER

## 2024-09-25 PROCEDURE — 3074F SYST BP LT 130 MM HG: CPT | Performed by: NURSE PRACTITIONER

## 2024-09-25 RX ORDER — ERYTHROMYCIN 5 MG/G
OINTMENT OPHTHALMIC NIGHTLY
COMMUNITY
Start: 2024-06-14

## 2024-09-25 ASSESSMENT — ENCOUNTER SYMPTOMS
RESPIRATORY NEGATIVE: 1
BLOOD IN STOOL: 1
ALLERGIC/IMMUNOLOGIC NEGATIVE: 1
ANAL BLEEDING: 1
ABDOMINAL PAIN: 1

## 2024-09-27 ENCOUNTER — HOSPITAL ENCOUNTER (OUTPATIENT)
Age: 44
Discharge: HOME | End: 2024-09-27
Payer: COMMERCIAL

## 2024-09-27 DIAGNOSIS — Z01.818 PRE-OP TESTING: ICD-10-CM

## 2024-09-27 DIAGNOSIS — K62.5 RECTAL BLEEDING: ICD-10-CM

## 2024-09-27 LAB
CRP SERPL HS-MCNC: 5.8 MG/L (ref 0–5)
ERYTHROCYTE [SEDIMENTATION RATE] IN BLOOD BY PHOTOMETRIC METHOD: 5 MM/HR (ref 0–20)
GLIADIN IGA SER IA-ACNC: NORMAL U/ML
GLIADIN IGG SER IA-ACNC: NORMAL U/ML
IGA SERPL-MCNC: 248 MG/DL (ref 70–400)
TTG IGA SER IA-ACNC: NORMAL U/ML

## 2024-09-27 PROCEDURE — 93005 ELECTROCARDIOGRAM TRACING: CPT

## 2024-09-27 PROCEDURE — 83516 IMMUNOASSAY NONANTIBODY: CPT

## 2024-09-27 PROCEDURE — 82784 ASSAY IGA/IGD/IGG/IGM EACH: CPT

## 2024-09-27 PROCEDURE — 86140 C-REACTIVE PROTEIN: CPT

## 2024-09-27 PROCEDURE — 36415 COLL VENOUS BLD VENIPUNCTURE: CPT

## 2024-09-27 PROCEDURE — 85652 RBC SED RATE AUTOMATED: CPT

## 2024-09-28 LAB
EKG ATRIAL RATE: 78 BPM
EKG P AXIS: 54 DEGREES
EKG P-R INTERVAL: 164 MS
EKG Q-T INTERVAL: 400 MS
EKG QRS DURATION: 82 MS
EKG QTC CALCULATION (BAZETT): 456 MS
EKG R AXIS: 50 DEGREES
EKG T AXIS: 17 DEGREES
EKG VENTRICULAR RATE: 78 BPM

## 2024-09-30 LAB
GLIADIN IGA SER IA-ACNC: 2.1 U/ML
GLIADIN IGG SER IA-ACNC: <0.4 U/ML
IGA SERPL-MCNC: 248 MG/DL (ref 70–400)
TTG IGA SER IA-ACNC: 0.4 U/ML

## 2024-10-01 ENCOUNTER — HOSPITAL ENCOUNTER (OUTPATIENT)
Age: 44
Setting detail: SPECIMEN
Discharge: HOME | End: 2024-10-01
Payer: COMMERCIAL

## 2024-10-01 ENCOUNTER — TELEPHONE (OUTPATIENT)
Dept: GASTROENTEROLOGY | Age: 44
End: 2024-10-01

## 2024-10-01 DIAGNOSIS — K62.5 RECTAL BLEEDING: ICD-10-CM

## 2024-10-01 PROCEDURE — 83993 ASSAY FOR CALPROTECTIN FECAL: CPT

## 2024-10-01 PROCEDURE — 87506 IADNA-DNA/RNA PROBE TQ 6-11: CPT

## 2024-10-01 PROCEDURE — 87328 CRYPTOSPORIDIUM AG IA: CPT

## 2024-10-01 PROCEDURE — 87329 GIARDIA AG IA: CPT

## 2024-10-01 NOTE — TELEPHONE ENCOUNTER
----- Message from BRUNA Feliciano CNP sent at 9/30/2024  9:12 AM EDT -----  CRP mildly elevated.  Sed rate is normal.  Celiac panel is pending.

## 2024-10-02 LAB
C PARVUM AG STL QL IA: NEGATIVE
CAMPYLOBACTER DNA SPEC NAA+PROBE: NORMAL
ETEC ELTA+ESTB GENES STL QL NAA+PROBE: NORMAL
G LAMBLIA AG STL QL IA: NEGATIVE
P SHIGELLOIDES DNA STL QL NAA+PROBE: NORMAL
SALMONELLA DNA SPEC QL NAA+PROBE: NORMAL
SHIGA TOXIN STX GENE SPEC NAA+PROBE: NORMAL
SHIGELLA DNA SPEC QL NAA+PROBE: NORMAL
SOURCE: NORMAL
SPECIMEN DESCRIPTION: NORMAL
SPECIMEN DESCRIPTION: NORMAL
V CHOL+PARA RFBL+TRKH+TNAA STL QL NAA+PR: NORMAL
Y ENTERO RECN STL QL NAA+PROBE: NORMAL

## 2024-10-03 LAB — CALPROTECTIN, FECAL: 18 UG/G

## 2024-10-07 NOTE — PROGRESS NOTES
Sugar  [] PT/INR    [] T&S       Do you have any metal allergies? [] Yes    [x] No      If yes, to what metals:                   Patient instructed on the pre-operative, intra-operative, and post-operative process?   Yes  Medication instructions reviewed with patient?  Yes

## 2024-10-11 ENCOUNTER — ANESTHESIA EVENT (OUTPATIENT)
Dept: ENDOSCOPY | Age: 44
End: 2024-10-11
Payer: COMMERCIAL

## 2024-10-14 ENCOUNTER — HOSPITAL ENCOUNTER (OUTPATIENT)
Age: 44
Setting detail: OUTPATIENT SURGERY
Discharge: HOME | End: 2024-10-14
Attending: STUDENT IN AN ORGANIZED HEALTH CARE EDUCATION/TRAINING PROGRAM | Admitting: STUDENT IN AN ORGANIZED HEALTH CARE EDUCATION/TRAINING PROGRAM
Payer: COMMERCIAL

## 2024-10-14 ENCOUNTER — ANESTHESIA (OUTPATIENT)
Dept: ENDOSCOPY | Age: 44
End: 2024-10-14
Payer: COMMERCIAL

## 2024-10-14 VITALS
OXYGEN SATURATION: 97 % | BODY MASS INDEX: 29.39 KG/M2 | WEIGHT: 155.7 LBS | SYSTOLIC BLOOD PRESSURE: 133 MMHG | TEMPERATURE: 97.1 F | DIASTOLIC BLOOD PRESSURE: 82 MMHG | HEART RATE: 72 BPM | RESPIRATION RATE: 14 BRPM | HEIGHT: 61 IN

## 2024-10-14 PROBLEM — K62.5 RECTAL BLEEDING: Status: ACTIVE | Noted: 2024-10-14

## 2024-10-14 LAB — HCG UR QL: NEGATIVE

## 2024-10-14 PROCEDURE — 6360000002 HC RX W HCPCS: Performed by: NURSE ANESTHETIST, CERTIFIED REGISTERED

## 2024-10-14 PROCEDURE — 3609027000 HC COLONOSCOPY: Performed by: STUDENT IN AN ORGANIZED HEALTH CARE EDUCATION/TRAINING PROGRAM

## 2024-10-14 PROCEDURE — 2580000003 HC RX 258: Performed by: STUDENT IN AN ORGANIZED HEALTH CARE EDUCATION/TRAINING PROGRAM

## 2024-10-14 PROCEDURE — 45378 DIAGNOSTIC COLONOSCOPY: CPT | Performed by: STUDENT IN AN ORGANIZED HEALTH CARE EDUCATION/TRAINING PROGRAM

## 2024-10-14 PROCEDURE — 7100000011 HC PHASE II RECOVERY - ADDTL 15 MIN: Performed by: STUDENT IN AN ORGANIZED HEALTH CARE EDUCATION/TRAINING PROGRAM

## 2024-10-14 PROCEDURE — 7100000010 HC PHASE II RECOVERY - FIRST 15 MIN: Performed by: STUDENT IN AN ORGANIZED HEALTH CARE EDUCATION/TRAINING PROGRAM

## 2024-10-14 PROCEDURE — 3700000000 HC ANESTHESIA ATTENDED CARE: Performed by: STUDENT IN AN ORGANIZED HEALTH CARE EDUCATION/TRAINING PROGRAM

## 2024-10-14 PROCEDURE — 2709999900 HC NON-CHARGEABLE SUPPLY: Performed by: STUDENT IN AN ORGANIZED HEALTH CARE EDUCATION/TRAINING PROGRAM

## 2024-10-14 PROCEDURE — 3700000001 HC ADD 15 MINUTES (ANESTHESIA): Performed by: STUDENT IN AN ORGANIZED HEALTH CARE EDUCATION/TRAINING PROGRAM

## 2024-10-14 PROCEDURE — 2500000003 HC RX 250 WO HCPCS: Performed by: NURSE ANESTHETIST, CERTIFIED REGISTERED

## 2024-10-14 PROCEDURE — 81025 URINE PREGNANCY TEST: CPT

## 2024-10-14 RX ORDER — LIDOCAINE HYDROCHLORIDE 10 MG/ML
INJECTION, SOLUTION EPIDURAL; INFILTRATION; INTRACAUDAL; PERINEURAL
Status: DISCONTINUED | OUTPATIENT
Start: 2024-10-14 | End: 2024-10-14 | Stop reason: SDUPTHER

## 2024-10-14 RX ORDER — SODIUM CHLORIDE 9 MG/ML
INJECTION, SOLUTION INTRAVENOUS CONTINUOUS
Status: DISCONTINUED | OUTPATIENT
Start: 2024-10-14 | End: 2024-10-14 | Stop reason: HOSPADM

## 2024-10-14 RX ORDER — PROPOFOL 10 MG/ML
INJECTION, EMULSION INTRAVENOUS
Status: DISCONTINUED | OUTPATIENT
Start: 2024-10-14 | End: 2024-10-14 | Stop reason: SDUPTHER

## 2024-10-14 RX ADMIN — PROPOFOL 30 MG: 10 INJECTION, EMULSION INTRAVENOUS at 08:31

## 2024-10-14 RX ADMIN — PROPOFOL 30 MG: 10 INJECTION, EMULSION INTRAVENOUS at 08:45

## 2024-10-14 RX ADMIN — PROPOFOL 30 MG: 10 INJECTION, EMULSION INTRAVENOUS at 08:41

## 2024-10-14 RX ADMIN — LIDOCAINE HYDROCHLORIDE 50 MG: 10 INJECTION, SOLUTION EPIDURAL; INFILTRATION; INTRACAUDAL; PERINEURAL at 08:25

## 2024-10-14 RX ADMIN — PROPOFOL 30 MG: 10 INJECTION, EMULSION INTRAVENOUS at 08:35

## 2024-10-14 RX ADMIN — PROPOFOL 100 MG: 10 INJECTION, EMULSION INTRAVENOUS at 08:25

## 2024-10-14 RX ADMIN — PROPOFOL 75 MCG/KG/MIN: 10 INJECTION, EMULSION INTRAVENOUS at 08:25

## 2024-10-14 RX ADMIN — PROPOFOL 30 MG: 10 INJECTION, EMULSION INTRAVENOUS at 08:37

## 2024-10-14 RX ADMIN — SODIUM CHLORIDE: 9 INJECTION, SOLUTION INTRAVENOUS at 08:21

## 2024-10-14 ASSESSMENT — PAIN SCALES - GENERAL: PAINLEVEL_OUTOF10: 0

## 2024-10-14 ASSESSMENT — PAIN - FUNCTIONAL ASSESSMENT: PAIN_FUNCTIONAL_ASSESSMENT: 0-10

## 2024-10-14 NOTE — OP NOTE
Operative Note      Patient: Doris Briones  YOB: 1980  MRN: [id number]    Date of Procedure: 10/14/2024    Pre-Op Diagnosis Codes:      * Rectal bleeding [K62.5]    Post-Op Diagnosis: Same       Procedure(s):  COLONOSCOPY    Surgeon(s):  Ethan Montes De Oca DO    Assistant:   * No surgical staff found *    Anesthesia: Monitor Anesthesia Care    Estimated Blood Loss (mL): Minimal    Complications: None    Specimens:   * No specimens in log *    Implants:  * No implants in log *      Drains: * No LDAs found *    Findings:  Infection Present At Time Of Surgery (PATOS) (choose all levels that have infection present):  No infection present  Other Findings: grade 2 internal hemorrhoids    Detailed Description of Procedure:     HISTORY: The patient is a 44 y.o. year old female with history of above preop diagnosis.  I recommended colonoscopy with possible biopsy or polypectomy and I explained the risk, benefits, expected outcome, and alternatives to the procedure.  Risks included but are not limited to bleeding, infection, respiratory distress, hypotension, and perforation of the colon.  The patient understands and is in agreement.        PROCEDURE: The patient was given IV conscious sedation per anesthesia. The patient was given 4 L of O2 /minute by nasal cannula.  The patient's SPO2 remained above 90% throughout the procedure. The colonoscope was inserted per rectum and advanced under direct vision to the cecum without difficulty. Prep adequate.    Findings:  Cecum/Ascending colon: normal    Transverse colon: normal    Descending/Sigmoid colon: normal    Rectum/Anus: abnormal: grade 2 internal hemorrhoids    The colon was decompressed and the scope was removed.  The patient tolerated the procedure well.     IMPRESSION/PLAN:   Repeat colonoscopy in 10 years        Electronically signed by Ethan Montes De Oca DO on 10/14/2024 at 9:03 AM

## 2024-10-14 NOTE — ANESTHESIA POSTPROCEDURE EVALUATION
Department of Anesthesiology  Postprocedure Note    Patient: Doris Briones  MRN: [id number]  YOB: 1980  Date of evaluation: 10/14/2024    Procedure Summary       Date: 10/14/24 Room / Location: [place] [address] / [facility] ENDOSCOPY    Anesthesia Start: 0822 Anesthesia Stop: 0900    Procedure: COLONOSCOPY Diagnosis:       Rectal bleeding      (Rectal bleeding [K62.5])    Surgeons: Ethan Montes De Oca DO Responsible Provider: Alo Omer APRN - MUSTAPHA    Anesthesia Type: MAC ASA Status: 2            Anesthesia Type: No value filed.    Lisette Phase I: Lisette Score: 10    Lisette Phase II: Lisette Score: 10    Anesthesia Post Evaluation    Patient location during evaluation: PACU  Patient participation: complete - patient participated  Level of consciousness: awake and lethargic  Pain score: 0  Airway patency: patent  Nausea & Vomiting: no nausea and no vomiting  Cardiovascular status: hemodynamically stable  Respiratory status: acceptable, nonlabored ventilation, room air and spontaneous ventilation  Hydration status: euvolemic  Multimodal analgesia pain management approach  Pain management: adequate and satisfactory to patient    No notable events documented.

## 2024-10-14 NOTE — H&P
Update History & Physical    The patient's History and Physical of September 25, 2024 was reviewed with the patient and I examined the patient. There was no change. The surgical site was confirmed by the patient and me.     PE:  General Appearance/Constitutional:  Alert, NAD.Orientation age-appropriate.   Pulmonary/Chest: clear to auscultation bilaterally.  Cardiovascular: normal rate, regular rhythm.  Lymphatics: No cervical lymphadenopathy noted.  Abdomen: soft, non-tender, non-distended, normal bowel sounds.  No pain with light palpation, no peritoneal findings.    Plan: The risks, benefits, expected outcome, and alternative to the recommended procedure have been discussed with the patient. Patient understands and wants to proceed with the procedure.     Electronically signed by Ethan Montes De Oca DO on 10/14/2024 at 8:18 AM          [address] [place] [address]          Chief Complaint   Patient presents with    Rectal Bleeding       Ongoing x 3 days. Pt states on Tuesday 9/17 she had an episode of emesis and diarrhea at the same time. Pt reports bright red blood clots come out with her bowel movements since then. States no blood since Thursday. Pt denies abd pain, but was having some bad when rectal bleeding was occurring. Denies pain when passing a stool. Pt denies black tarry stools.     Colonoscopy       Family history of upper GI cancer: No  Family history of colon cancer: No  Blood in stool: Yes  Unintentional weight loss: No  Abdominal pain: Yes  Prior colonoscopy: Yes  Prior EGD: Yes  Constipation history: No  Number of bowel movements a day: 1  Change in stool caliber: No         HPI Doris Briones is a 44 y.o. old female who has a past medical history of anxiety, depression, hypertension presents today as a new referral with concern for new onset rectal bleeding.  According to Doris, last Tuesday (9/17/2024) she began to experience lower abdominal cramping and nausea which resulted in an

## 2024-10-14 NOTE — DISCHARGE INSTRUCTIONS
Discharge Instructions:    Diet:   You may resume a regular diet.    Activity:   Light activity for remainder of the day.    Reasons to Return:   Some abdominal soreness common, especially for the first 24-48 hours. If, however, you experience worsening pain, fever above 101.5 degrees Farenheit, bleeding that does not stop soon after discovery, or any other concerns, please either call the office or call/return to the Emergency Department for further evaluation.    Follow up with Dr. Montes De Oca as needed    Discharge Instructions for Colonoscopy    Do not drive or operate machinery for 24 hours.  Do not make important personal or business decisions for 24 hours.   Do not drink alcoholic beverages for 24 hours.  Do not smoke tobacco products for 24 hours.  It is normal to have a feeling of fullness or mild cramping in your abdomen afterwards due to air that is put into your bowel during the procedure. Mild activities such as walking will help you pass the air.  You may resume your regular diet.   Results from a biopsy may take up to 2 weeks to return from pathology.  Make a follow-up appointment with PCP to be seen in 2 weeks.     SEEK MEDICAL ATTENTION IF:    Chest Pain or trouble breathing.    Persistent and significant bleeding from your rectum, such as soaking through clothing and/or feeling dizzy and sweating.    A fever above 101F or if you have chills.    If symptoms are severe call 911 or go to the nearest Emergency Room.

## 2024-10-15 ENCOUNTER — TELEPHONE (OUTPATIENT)
Dept: GASTROENTEROLOGY | Age: 44
End: 2024-10-15

## 2024-10-15 NOTE — TELEPHONE ENCOUNTER
----- Message from BRUNA Feliciano CNP sent at 10/14/2024  4:26 PM EDT -----  Please notify patient that their lab results are normal.

## 2024-10-31 ENCOUNTER — OFFICE VISIT (OUTPATIENT)
Dept: PRIMARY CARE CLINIC | Age: 44
End: 2024-10-31
Payer: COMMERCIAL

## 2024-10-31 VITALS
HEIGHT: 61 IN | OXYGEN SATURATION: 99 % | HEART RATE: 104 BPM | WEIGHT: 154 LBS | SYSTOLIC BLOOD PRESSURE: 120 MMHG | DIASTOLIC BLOOD PRESSURE: 80 MMHG | BODY MASS INDEX: 29.07 KG/M2

## 2024-10-31 DIAGNOSIS — F41.9 ANXIETY AND DEPRESSION: ICD-10-CM

## 2024-10-31 DIAGNOSIS — Z00.00 ENCOUNTER FOR WELL ADULT EXAM WITHOUT ABNORMAL FINDINGS: Primary | ICD-10-CM

## 2024-10-31 DIAGNOSIS — F32.A ANXIETY AND DEPRESSION: ICD-10-CM

## 2024-10-31 DIAGNOSIS — Z86.79 HX OF SUPRAVENTRICULAR TACHYCARDIA: ICD-10-CM

## 2024-10-31 DIAGNOSIS — E66.3 OVERWEIGHT WITH BODY MASS INDEX (BMI) OF 28 TO 28.9 IN ADULT: ICD-10-CM

## 2024-10-31 DIAGNOSIS — I10 ESSENTIAL HYPERTENSION: ICD-10-CM

## 2024-10-31 PROCEDURE — 3074F SYST BP LT 130 MM HG: CPT | Performed by: NURSE PRACTITIONER

## 2024-10-31 PROCEDURE — 99396 PREV VISIT EST AGE 40-64: CPT | Performed by: NURSE PRACTITIONER

## 2024-10-31 PROCEDURE — 3079F DIAST BP 80-89 MM HG: CPT | Performed by: NURSE PRACTITIONER

## 2024-10-31 RX ORDER — BUPROPION HYDROCHLORIDE 75 MG/1
75 TABLET ORAL DAILY
Qty: 90 TABLET | Refills: 1 | Status: SHIPPED | OUTPATIENT
Start: 2024-10-31

## 2024-10-31 RX ORDER — HYDROCHLOROTHIAZIDE 12.5 MG/1
12.5 CAPSULE ORAL EVERY MORNING
Qty: 90 CAPSULE | Refills: 2 | Status: SHIPPED | OUTPATIENT
Start: 2024-10-31

## 2024-10-31 ASSESSMENT — ENCOUNTER SYMPTOMS
CHEST TIGHTNESS: 0
SORE THROAT: 0
SHORTNESS OF BREATH: 0
EYES NEGATIVE: 1
COUGH: 0
WHEEZING: 0
ABDOMINAL DISTENTION: 0

## 2024-10-31 NOTE — PROGRESS NOTES
Psychiatric/Behavioral:  Negative for behavioral problems and sleep disturbance.        No Known Allergies  Prior to Visit Medications    Medication Sig Taking? 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      Past Surgical History:   Procedure Laterality Date     SECTION      Dr. Grimm    CLAVICLE SURGERY Right 2013    COLONOSCOPY N/A 10/14/2024    grade 2 internal hemorroids, next due 10 years =   by Ethan [facility]es De Oca, DO at [facility] ENDOSCOPY    SALPINGECTOMY Bilateral      Family History   Problem Relation Age of Onset    Hypertension Mother     Depression Mother     Other Mother 60        alpha 1 antitrypsin deficiency     Other Father         essential tremor maeve upper extremities    Other Brother         familial tremor    Heart Disease Brother         valve disorder    Hypertension Maternal Grandmother     Emphysema Maternal Grandfather         smoker    Other Paternal Grandmother         sleep apnea    Heart Disease Paternal Grandfather         11 stents,     Other Paternal Grandfather         back problems nerve stimulator     Social History     Tobacco Use    Smoking status: Former     Current packs/day: 0.00     Average packs/day: 0.5 packs/day for 10.0 years (5.0 ttl pk-yrs)     Types: Cigarettes     Start date: 2009     Quit date: 2019

## 2024-10-31 NOTE — PATIENT INSTRUCTIONS
THANK YOU FOR TRUSTING US WITH YOUR HEALTHCARE !!    The associates caring for you today were:    Family Practice Provider: Sheri MCFARLAND-CNP    Clinical Team Nurse: Lorie Baldwin LPN    Clinical Team Medical Assistant: Cyndi Fine MA    Our goal is to provide you with EXCEPTIONAL patient care, and we strive to do this for EVERY patient, EVERY visit. Since we care about you and your experience, you may receive a patient satisfaction survey from [employer] by postal mail/email/text. We truly welcome your feedback and ask that you complete the survey to let us know how we are doing.    Important Numbers:  [facility] phone [phone]   [place] Office Nurse/MA Line: [phone]  Fax  [phone]   Central [phone]  Billing questions: [phone]  Medical Records Request: [phone]    Manage your healthcare  with [facility] My[facility]. To activate your account, visit [email or web address]   DIGITAL scheduling available now through my chart.  Schedule your next appointment at your convenience through your my chart.       [place] Office Hours:  Monday: [place] office location 8-5 ([phone]) Offering additional late hours the first Monday of the month until 7 pm.   Tuesday: 8: :  812 Noon, closed  of the month   : 7:30-4:30   SURVEY:    You may be receiving a survey from [employer] regarding your visit today.    Please complete the survey to enable us to provide the highest quality of care to you and your family.    If you cannot score us a very good on any question, please call the office to discuss how we could have made your experience a very good one.    Thank you.        Well Visit, Ages 18 to 65: Care Instructions  Well visits can help you stay healthy. Your doctor has checked your overall health and may have suggested ways to take good care of yourself.

## 2025-03-15 NOTE — TELEPHONE ENCOUNTER
From: Doris Briones  To: Sheri Hatfield  Sent: 5/2/2024 10:16 AM EDT  Subject: Recent lab results     I have some concerns regarding my continued elevated fasting glucose. This has been consistently over 100 for some time. Also, I feel that regardless of what I have changed, my weight continues to creep up. I’m sure the two can be correlated. Is there anything I could change or try to help with this? I have increased my activity by walking at least 3x/week , limiting sweets/sugars/carbs, opting for grilled or baked items rather than fried, etc.. I'm at a loss.   
f/u with dentist. Return to ED for new or worsening symptoms.

## 2025-04-28 RX ORDER — HYDROCHLOROTHIAZIDE 12.5 MG/1
12.5 CAPSULE ORAL EVERY MORNING
Qty: 90 CAPSULE | Refills: 2 | OUTPATIENT
Start: 2025-04-28

## 2025-05-01 ENCOUNTER — OFFICE VISIT (OUTPATIENT)
Dept: PRIMARY CARE CLINIC | Age: 45
End: 2025-05-01
Payer: COMMERCIAL

## 2025-05-01 VITALS
WEIGHT: 138 LBS | DIASTOLIC BLOOD PRESSURE: 80 MMHG | BODY MASS INDEX: 26.07 KG/M2 | HEART RATE: 88 BPM | OXYGEN SATURATION: 99 % | SYSTOLIC BLOOD PRESSURE: 116 MMHG

## 2025-05-01 DIAGNOSIS — Z86.79 HX OF SUPRAVENTRICULAR TACHYCARDIA: ICD-10-CM

## 2025-05-01 DIAGNOSIS — F32.A ANXIETY AND DEPRESSION: ICD-10-CM

## 2025-05-01 DIAGNOSIS — F41.9 ANXIETY AND DEPRESSION: ICD-10-CM

## 2025-05-01 DIAGNOSIS — Z12.31 BREAST CANCER SCREENING BY MAMMOGRAM: ICD-10-CM

## 2025-05-01 DIAGNOSIS — I10 ESSENTIAL HYPERTENSION: Primary | ICD-10-CM

## 2025-05-01 PROCEDURE — 3079F DIAST BP 80-89 MM HG: CPT | Performed by: NURSE PRACTITIONER

## 2025-05-01 PROCEDURE — 99213 OFFICE O/P EST LOW 20 MIN: CPT | Performed by: NURSE PRACTITIONER

## 2025-05-01 PROCEDURE — 3074F SYST BP LT 130 MM HG: CPT | Performed by: NURSE PRACTITIONER

## 2025-05-01 SDOH — ECONOMIC STABILITY: FOOD INSECURITY: WITHIN THE PAST 12 MONTHS, THE FOOD YOU BOUGHT JUST DIDN'T LAST AND YOU DIDN'T HAVE MONEY TO GET MORE.: NEVER TRUE

## 2025-05-01 SDOH — ECONOMIC STABILITY: FOOD INSECURITY: WITHIN THE PAST 12 MONTHS, YOU WORRIED THAT YOUR FOOD WOULD RUN OUT BEFORE YOU GOT MONEY TO BUY MORE.: NEVER TRUE

## 2025-05-01 ASSESSMENT — ENCOUNTER SYMPTOMS
SINUS PRESSURE: 0
SORE THROAT: 0
COUGH: 0
ABDOMINAL DISTENTION: 0
EYES NEGATIVE: 1
CHEST TIGHTNESS: 0

## 2025-05-01 ASSESSMENT — PATIENT HEALTH QUESTIONNAIRE - PHQ9
8. MOVING OR SPEAKING SO SLOWLY THAT OTHER PEOPLE COULD HAVE NOTICED. OR THE OPPOSITE, BEING SO FIGETY OR RESTLESS THAT YOU HAVE BEEN MOVING AROUND A LOT MORE THAN USUAL: NOT AT ALL
9. THOUGHTS THAT YOU WOULD BE BETTER OFF DEAD, OR OF HURTING YOURSELF: NOT AT ALL
4. FEELING TIRED OR HAVING LITTLE ENERGY: NOT AT ALL
SUM OF ALL RESPONSES TO PHQ QUESTIONS 1-9: 0
7. TROUBLE CONCENTRATING ON THINGS, SUCH AS READING THE NEWSPAPER OR WATCHING TELEVISION: NOT AT ALL
5. POOR APPETITE OR OVEREATING: NOT AT ALL
6. FEELING BAD ABOUT YOURSELF - OR THAT YOU ARE A FAILURE OR HAVE LET YOURSELF OR YOUR FAMILY DOWN: NOT AT ALL
10. IF YOU CHECKED OFF ANY PROBLEMS, HOW DIFFICULT HAVE THESE PROBLEMS MADE IT FOR YOU TO DO YOUR WORK, TAKE CARE OF THINGS AT HOME, OR GET ALONG WITH OTHER PEOPLE: NOT DIFFICULT AT ALL
SUM OF ALL RESPONSES TO PHQ QUESTIONS 1-9: 0
2. FEELING DOWN, DEPRESSED OR HOPELESS: NOT AT ALL
1. LITTLE INTEREST OR PLEASURE IN DOING THINGS: NOT AT ALL
3. TROUBLE FALLING OR STAYING ASLEEP: NOT AT ALL
SUM OF ALL RESPONSES TO PHQ QUESTIONS 1-9: 0
SUM OF ALL RESPONSES TO PHQ QUESTIONS 1-9: 0

## 2025-05-01 NOTE — PATIENT INSTRUCTIONS
SURVEY:    You may be receiving a survey from [employer] regarding your visit today.    Please complete the survey to enable us to provide the highest quality of care to you and your family.    If you cannot score us a very good on any question, please call the office to discuss how we could have made your experience a very good one.    Thank you. THANK YOU FOR TRUSTING US WITH YOUR HEALTHCARE !!    The associates caring for you today were:    Family Practice Provider: Sheri MCFARLAND-ZIYAD    Clinical Team Nurse: Lorie Baldwin LPN    Clinical Team Medical Assistant: Cyndi Fine MA    Our goal is to provide you with EXCEPTIONAL patient care, and we strive to do this for EVERY patient, EVERY visit. Since we care about you and your experience, you may receive a patient satisfaction survey from [employer] by postal mail/email/text. We truly welcome your feedback and ask that you complete the survey to let us know how we are doing.    Important Numbers:  [facility] phone [phone]   [place] Office Nurse/MA Line: [phone]  Fax  [phone]   Central [phone]  Billing questions: [phone]  Medical Records Request: [phone]    Manage your healthcare  with [facility] MyChart. To activate your account, visit [email or web address]   DIGITAL scheduling available now through my chart.  Schedule your next appointment at your convenience through your my chart.       [place] Office Hours:  Monday: [place] office location 8-5 ([phone]) Offering additional late hours the first Monday of the month until 7 pm.   Tuesday: : :   Noon, closed  of the month   : 7:30-4:30       Please get Tdap  (tetanus, diptheria and pertussis ) vaccine at local pharmacy

## 2025-05-01 NOTE — PROGRESS NOTES
[facility] [place]  [address] [address]  Dept: [phone]  Dept Fax: [phone]    Last encounter 10/31/2024    Hypertension (6 month ) and Anxiety (6 month)       HPI:   Doris Briones is a 45 y.o. female who presentstoday for her medical conditions/complaints as noted below.  Doris Briones is c/o of Hypertension (6 month ) and Anxiety (6 month)      Hypertension  Associated symptoms include anxiety. Pertinent negatives include no chest pain or headaches.   Anxiety  Patient reports no chest pain, dizziness or nervous/anxious behavior.       Established patient    Pt stopped the ozempic semaglutide 30 pound weight loss with use through [facility]. Pt has done well.   Still having periods. Tubes removed not able to get pregnancy     Doing well     Htn pay attention to bp if any dizziness. 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       Past Surgical History:   Procedure Laterality Date     SECTION      Dr. Grimm    CLAVICLE SURGERY Right 2013    COLONOSCOPY N/A 10/14/2024    grade 2 internal hemorroids, next due 10 years =   by Ethan Montes De Oca, DO at [facility] ENDOSCOPY    SALPINGECTOMY Bilateral        Family History   Problem Relation Age of Onset    Hypertension Mother     Depression Mother     Other Mother 60        alpha 1 antitrypsin deficiency     Other Father         essential tremor maeve upper extremities    Other Brother         familial tremor    Heart Disease Brother         valve disorder    Hypertension

## 2025-05-23 ENCOUNTER — HOSPITAL ENCOUNTER (OUTPATIENT)
Dept: MAMMOGRAPHY | Age: 45
Discharge: HOME | End: 2025-05-25
Payer: COMMERCIAL

## 2025-05-23 DIAGNOSIS — Z12.31 BREAST CANCER SCREENING BY MAMMOGRAM: ICD-10-CM

## 2025-05-23 PROCEDURE — 77063 BREAST TOMOSYNTHESIS BI: CPT

## 2025-05-27 ENCOUNTER — RESULTS FOLLOW-UP (OUTPATIENT)
Dept: PRIMARY CARE CLINIC | Age: 45
End: 2025-05-27

## 2025-06-10 RX ORDER — BUPROPION HYDROCHLORIDE 75 MG/1
TABLET ORAL
Qty: 90 TABLET | Refills: 1 | Status: SHIPPED | OUTPATIENT
Start: 2025-06-10

## 2025-06-10 NOTE — TELEPHONE ENCOUNTER
Last OV: 5/1/2025  Last RX: 10/31/2024   Next scheduled apt: 11/4/2025    Surescripts requesting refill

## 2025-08-26 DIAGNOSIS — I10 ESSENTIAL HYPERTENSION: ICD-10-CM

## 2025-08-27 RX ORDER — DILTIAZEM HYDROCHLORIDE 180 MG/1
180 CAPSULE, COATED, EXTENDED RELEASE ORAL DAILY
Qty: 90 CAPSULE | Refills: 3 | Status: SHIPPED | OUTPATIENT
Start: 2025-08-27

## (undated) DEVICE — SYRINGE MED 50ML LUERSLIP TIP

## (undated) DEVICE — Device: Brand: DEFENDO VALVE AND CONNECTOR KIT

## (undated) DEVICE — ENDO KIT W/SYRINGE: Brand: MEDLINE INDUSTRIES, INC.

## (undated) DEVICE — 4-PORT MANIFOLD: Brand: NEPTUNE 2

## (undated) DEVICE — SOLUTION IRRIG 1000ML STRL H2O USP PLAS POUR BTL

## (undated) DEVICE — HYBRID TUBING WITH CO2 CONNECTION FOR OLYMPUS 140/160/180/190 SERIES GI ENDOSCOPES WITH OLYMPUS AFU-100 , OLYMPUS OFP: Brand: ENDOGATOR HYBRID IRRIGATION TUBING